# Patient Record
Sex: FEMALE | Race: WHITE | NOT HISPANIC OR LATINO | Employment: FULL TIME | URBAN - NONMETROPOLITAN AREA
[De-identification: names, ages, dates, MRNs, and addresses within clinical notes are randomized per-mention and may not be internally consistent; named-entity substitution may affect disease eponyms.]

---

## 2018-01-18 ENCOUNTER — OFFICE VISIT CONVERTED (OUTPATIENT)
Dept: FAMILY MEDICINE CLINIC | Age: 33
End: 2018-01-18
Attending: NURSE PRACTITIONER

## 2019-02-09 ENCOUNTER — HOSPITAL ENCOUNTER (EMERGENCY)
Facility: HOSPITAL | Age: 34
Discharge: PSYCHIATRIC OR SUBSTANCE USE FACILITY | End: 2019-02-09
Attending: EMERGENCY MEDICINE | Admitting: EMERGENCY MEDICINE

## 2019-02-09 ENCOUNTER — HOSPITAL ENCOUNTER (INPATIENT)
Facility: HOSPITAL | Age: 34
LOS: 6 days | Discharge: HOME | End: 2019-02-15
Attending: SPECIALIST | Admitting: SPECIALIST

## 2019-02-09 VITALS
TEMPERATURE: 98.8 F | HEART RATE: 94 BPM | RESPIRATION RATE: 16 BRPM | DIASTOLIC BLOOD PRESSURE: 86 MMHG | SYSTOLIC BLOOD PRESSURE: 128 MMHG | HEIGHT: 67 IN | OXYGEN SATURATION: 97 %

## 2019-02-09 DIAGNOSIS — F32.A DEPRESSION, UNSPECIFIED DEPRESSION TYPE: Primary | ICD-10-CM

## 2019-02-09 DIAGNOSIS — IMO0002 SELF-INFLICTED INJURY: ICD-10-CM

## 2019-02-09 DIAGNOSIS — R45.851 SUICIDAL IDEATION: ICD-10-CM

## 2019-02-09 PROBLEM — F33.2 MAJOR DEPRESSIVE DISORDER, RECURRENT SEVERE WITHOUT PSYCHOTIC FEATURES (HCC): Status: ACTIVE | Noted: 2019-02-09

## 2019-02-09 LAB
ALBUMIN SERPL-MCNC: 4.6 G/DL (ref 3.5–5.2)
ALBUMIN/GLOB SERPL: 1.5 G/DL
ALP SERPL-CCNC: 63 U/L (ref 39–117)
ALT SERPL W P-5'-P-CCNC: 47 U/L (ref 1–33)
AMPHET+METHAMPHET UR QL: POSITIVE
ANION GAP SERPL CALCULATED.3IONS-SCNC: 16.7 MMOL/L
APAP SERPL-MCNC: <5 MCG/ML (ref 10–30)
AST SERPL-CCNC: 16 U/L (ref 1–32)
BARBITURATES UR QL SCN: NEGATIVE
BASOPHILS # BLD AUTO: 0.02 10*3/MM3 (ref 0–0.2)
BASOPHILS NFR BLD AUTO: 0.3 % (ref 0–1.5)
BENZODIAZ UR QL SCN: POSITIVE
BILIRUB SERPL-MCNC: 0.4 MG/DL (ref 0.1–1.2)
BUN BLD-MCNC: 14 MG/DL (ref 6–20)
BUN/CREAT SERPL: 17.5 (ref 7–25)
CALCIUM SPEC-SCNC: 9.5 MG/DL (ref 8.6–10.5)
CANNABINOIDS SERPL QL: NEGATIVE
CHLORIDE SERPL-SCNC: 105 MMOL/L (ref 98–107)
CO2 SERPL-SCNC: 23.3 MMOL/L (ref 22–29)
COCAINE UR QL: NEGATIVE
CREAT BLD-MCNC: 0.8 MG/DL (ref 0.57–1)
DEPRECATED RDW RBC AUTO: 43.1 FL (ref 37–54)
EOSINOPHIL # BLD AUTO: 0.01 10*3/MM3 (ref 0–0.7)
EOSINOPHIL NFR BLD AUTO: 0.2 % (ref 0.3–6.2)
ERYTHROCYTE [DISTWIDTH] IN BLOOD BY AUTOMATED COUNT: 12.6 % (ref 11.7–13)
ETHANOL BLD-MCNC: <10 MG/DL (ref 0–10)
ETHANOL UR QL: <0.01 %
GFR SERPL CREATININE-BSD FRML MDRD: 82 ML/MIN/1.73
GLOBULIN UR ELPH-MCNC: 3 GM/DL
GLUCOSE BLD-MCNC: 94 MG/DL (ref 65–99)
HCT VFR BLD AUTO: 40.9 % (ref 35.6–45.5)
HGB BLD-MCNC: 14.1 G/DL (ref 11.9–15.5)
IMM GRANULOCYTES # BLD AUTO: 0 10*3/MM3 (ref 0–0.03)
IMM GRANULOCYTES NFR BLD AUTO: 0 % (ref 0–0.5)
LYMPHOCYTES # BLD AUTO: 1.54 10*3/MM3 (ref 0.9–4.8)
LYMPHOCYTES NFR BLD AUTO: 23.4 % (ref 19.6–45.3)
MCH RBC QN AUTO: 32.3 PG (ref 26.9–32)
MCHC RBC AUTO-ENTMCNC: 34.5 G/DL (ref 32.4–36.3)
MCV RBC AUTO: 93.6 FL (ref 80.5–98.2)
METHADONE UR QL SCN: NEGATIVE
MONOCYTES # BLD AUTO: 0.2 10*3/MM3 (ref 0.2–1.2)
MONOCYTES NFR BLD AUTO: 3 % (ref 5–12)
NEUTROPHILS # BLD AUTO: 4.8 10*3/MM3 (ref 1.9–8.1)
NEUTROPHILS NFR BLD AUTO: 73.1 % (ref 42.7–76)
OPIATES UR QL: NEGATIVE
OXYCODONE UR QL SCN: NEGATIVE
PLATELET # BLD AUTO: 208 10*3/MM3 (ref 140–500)
PMV BLD AUTO: 10.3 FL (ref 6–12)
POTASSIUM BLD-SCNC: 3.4 MMOL/L (ref 3.5–5.2)
PROT SERPL-MCNC: 7.6 G/DL (ref 6–8.5)
RBC # BLD AUTO: 4.37 10*6/MM3 (ref 3.9–5.2)
SALICYLATES SERPL-MCNC: <0.3 MG/DL
SODIUM BLD-SCNC: 145 MMOL/L (ref 136–145)
T4 FREE SERPL-MCNC: 1.53 NG/DL (ref 0.93–1.7)
TSH SERPL DL<=0.05 MIU/L-ACNC: 1.65 MIU/ML (ref 0.27–4.2)
WBC NRBC COR # BLD: 6.57 10*3/MM3 (ref 4.5–10.7)

## 2019-02-09 PROCEDURE — 80307 DRUG TEST PRSMV CHEM ANLYZR: CPT | Performed by: EMERGENCY MEDICINE

## 2019-02-09 PROCEDURE — 84439 ASSAY OF FREE THYROXINE: CPT | Performed by: PSYCHIATRY & NEUROLOGY

## 2019-02-09 PROCEDURE — 85025 COMPLETE CBC W/AUTO DIFF WBC: CPT | Performed by: PSYCHIATRY & NEUROLOGY

## 2019-02-09 PROCEDURE — 80053 COMPREHEN METABOLIC PANEL: CPT | Performed by: PSYCHIATRY & NEUROLOGY

## 2019-02-09 PROCEDURE — 84443 ASSAY THYROID STIM HORMONE: CPT | Performed by: PSYCHIATRY & NEUROLOGY

## 2019-02-09 PROCEDURE — 99284 EMERGENCY DEPT VISIT MOD MDM: CPT

## 2019-02-09 RX ORDER — ALUMINA, MAGNESIA, AND SIMETHICONE 2400; 2400; 240 MG/30ML; MG/30ML; MG/30ML
15 SUSPENSION ORAL EVERY 6 HOURS PRN
Status: DISCONTINUED | OUTPATIENT
Start: 2019-02-09 | End: 2019-02-15 | Stop reason: HOSPADM

## 2019-02-09 RX ORDER — DIAZEPAM 10 MG/1
10 TABLET ORAL EVERY 8 HOURS PRN
COMMUNITY

## 2019-02-09 RX ORDER — DIAZEPAM 5 MG/1
10 TABLET ORAL EVERY 8 HOURS PRN
Status: DISCONTINUED | OUTPATIENT
Start: 2019-02-09 | End: 2019-02-15 | Stop reason: HOSPADM

## 2019-02-09 RX ORDER — ACETAMINOPHEN 325 MG/1
650 TABLET ORAL EVERY 4 HOURS PRN
Status: DISCONTINUED | OUTPATIENT
Start: 2019-02-09 | End: 2019-02-15 | Stop reason: HOSPADM

## 2019-02-09 RX ORDER — LAMOTRIGINE 100 MG/1
200 TABLET ORAL DAILY
COMMUNITY
End: 2019-02-15 | Stop reason: HOSPADM

## 2019-02-09 RX ADMIN — ACETAMINOPHEN 650 MG: 325 TABLET, FILM COATED ORAL at 23:42

## 2019-02-09 RX ADMIN — DIAZEPAM 10 MG: 5 TABLET ORAL at 20:13

## 2019-02-09 NOTE — CONSULTS
"Pt is a 33 year old, , female seen today in ED 09. Pt has been  to Neeraj for 5 years, but dating him for 12. She has an older child (15) from a previous relationship, as well as two children (10 & 3) with current . 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 \"Relapse of my depression, anxiety, PTSD, paranoia.\" Pt lists off these diagnoses when asked what prompted her visit to the ED. Pt very guarded and resistant to elaborate. Finally states, \"I'd rather not share.\" Pt avoids eye contact throughout entire interview. Pt requests that work supervisor/friend, Virginia stay in the room with her during interview. Pt anxiously biting/chewing on lip.    \"It's just a wave of everything I've ever been diagnosed with. Usually it comes and goes but this time it has shut me down.\" Pt has been seeing Dr. Varela on an outpatient basis at [facility] for approximately three years. PT last saw him last week when a few medication changes were made. Pt reports she also sees a therapist through [facility] but has not seen them since the end of 2018. Pt has had one previous inpatient psychiatric admission on [facility] in \"2012 or 2013 I think\" followed by IOP at . Pt states she had a previous suicide attempt, and when asked to elaborate she just states, \"Pills. A couple years ago.\"     Pt still unable to elaborate on recent events leading up to ED visit, so Virginia elaborates. Pt was at work for the past week, very flat, withdrawn, \"disconnected\", \"zoning off\", was not interacting with her peers like normal, and had become paranoid that supervisor was writing pt up, or going to fire her. Over the course of the past week, supervisor has been very encouraging towards patient, urging her to reach back out to Dr. Varela with how she was feeling in case it was an effect of the recent medication change. Apparently yesterday, supervisor made pt promise to call him, or she would call for her. Pt reports that she went " "home last night, cut her wrists, and then ended up calling Dr. Varela to inform him what was going on, who then recommended she come to the ED.     Pt has superficial cuts on bilateral forearms. Pt reports that she did this last night, but has done it before. Pt denies ever cutting anywhere else other than her wrists. Pt voices that she has been thinking of multiple ways of killing herself. These plans include, jumping off a bridge, taking \"anything I can find\" to fall asleep in the tub and drown, or \"slice my wrists harder to make sure I get the right vein.\"     Pt states that her  is not very supportive, and \"thinks I bring this on myself.\" Per Dr. Varela, when pt called him last night,  had warned her that if she went to the hospital, he would take the kids away from her. This caused pt to lie about where she was going this morning when leaving the house. Pt's parents came to the ED, picked up pt's car and apparently drove to pt's home to \"have an intervention\" with the . Pt does not mention her children at all during interview.     Discussed case with Dr. Varela who wants to admit pt to [facility], with high suicide precautions and a sitter in place. Medications were confirmed with pharmacy and reviewed with Dr. Varela as well. Pt informed of decision to admit, and was less than happy. Informed pt that she would be going up to [facility] and she would have a chance to sign herself in voluntarily for treatment. PT was reluctant to this idea. Reminded pt that she had been placed on a 72 hour hold when brought into the ED and that this hold could be continued to ensure pt's safety. Pt wants time to discuss this with Virginia, who seems very encouraging for pt to get help.     Report called to [facility] on [facility]  "

## 2019-02-09 NOTE — PLAN OF CARE
Problem: Patient Care Overview  Goal: Plan of Care Review  Outcome: Ongoing (interventions implemented as appropriate)   02/09/19 1835   OTHER   Outcome Summary PT to arrived on floor 1640 hrs, alert and orientated x 4. HSP in affect with 1: sitter at bedside. PT rates: anxiety 8/10, depression 9/10. Verbalized SI thoughts with no plan but with an opportunity. Otherwise,  Cooperative and  follows directions. Denies hallucinations nor pain. PT was orientated to floor. PT asked to phone all parents to bring in home meds that are nonformulary. PT phoned her father. Will continue to monitor behavior and provide support.    Coping/Psychosocial   Plan of Care Reviewed With patient   Coping/Psychosocial   Patient Agreement with Plan of Care agrees   Plan of Care Review   Progress no change     Goal: Individualization and Mutuality  Outcome: Ongoing (interventions implemented as appropriate)   02/09/19 1835   Personal Strengths/Vulnerabilities   Patient Personal Strengths expressive of emotions;expressive of needs;family/social support;medication/treatment adherence     Goal: Discharge Needs Assessment  Outcome: Ongoing (interventions implemented as appropriate)    Goal: Interprofessional Rounds/Family Conf  Outcome: Ongoing (interventions implemented as appropriate)      Problem: Overarching Goals (Adult)  Goal: Adheres to Safety Considerations for Self and Others  Outcome: Ongoing (interventions implemented as appropriate)    Goal: Optimized Coping Skills in Response to Life Stressors  Outcome: Ongoing (interventions implemented as appropriate)    Goal: Develops/Participates in Therapeutic [place] to Support Successful Transition  Outcome: Ongoing (interventions implemented as appropriate)      Problem: Suicidal Behavior (Adult)  Intervention: Provide Immediate/Ongoing Protective Physical Environment   02/09/19 1835   Provide Immediate/Ongoing Protective Physical Environment   Mutually Determined Action Steps (Provide  Immediate/Ongoing Protective Physical Environment) shares suicidal thoughts       Goal: Suicidal Behavior is Absent/Minimized/Managed  Outcome: Ongoing (interventions implemented as appropriate)

## 2019-02-09 NOTE — PLAN OF CARE
Problem: Suicidal Behavior (Adult)  Goal: Suicidal Behavior is Absent/Minimized/Managed  Outcome: Ongoing (interventions implemented as appropriate)   02/09/19 1525   OTHER   Action Step/Short Term Goal (STG) Established 02/09/19   Suicidal Behavior is Absent/Minimized/Managed   Suicidal Behavior Managed/Minimized Time Frame for Action Step (STG) 4 days   Suicidal Behavior Managed/Minimized Action Step (STG) Outcome making progress toward outcome       Problem: Mood Impairment (Depressive Signs/Symptoms) (Adult)  Goal: Improved Mood Symptoms (Depressive Signs/Symptoms)  Outcome: Ongoing (interventions implemented as appropriate)   02/09/19 1525   Improved Mood Symptoms (Depressive Signs/Symptoms)   Improved Mood Symptoms Action Step/Short Term Goal (STG) Established 02/09/19   Improved Mood Symptoms Time Frame for Action Step (STG) 4 days   Improved Mood Symptoms Action Step (STG) Outcome making progress toward outcome       Problem: Feelings of Worthlessness, Hopelessness, Excessive Guilt (Depressive Signs/Symptoms) (Adult)  Goal: Enhanced Self-Esteem/Confidence (Depressive Signs/Symptoms)  Outcome: Ongoing (interventions implemented as appropriate)   02/09/19 1525   Enhanced Self-Esteem/Confidence (Depressive Signs/Symptoms)   Enhanced Self-Esteem/Confidence Action Step/Short Term Goal (STG) Established 02/09/19   Enhanced Self-Esteem/Confidence Time Frame for Action Step (STG) 4 days   Enhanced Self-Esteem/Confidence Action Step (STG) Outcome making progress toward outcome       Problem: Decreased Participation/Engagement (Depressive Signs/Symptoms) (Adult)  Goal: Increased Participation/Engagement (Depressive Signs/Symptoms)  Outcome: Ongoing (interventions implemented as appropriate)   02/09/19 1525   Increased Participation/Engagement (Depressive Signs/Symptoms)   Increased Participation/Engagement Action Step/Short Term Goal (STG) Established 02/09/19   Increased Participation/Engagement Time Frame for Action  Step (STG) 4 days   Increased Participation/Engagement Action Step (STG) Outcome making progress toward outcome       Problem: Sleep Impairment (Depressive Signs/Symptoms) (Adult)  Goal: Improved Sleep Hygiene (Depressive Signs/Symptoms)  Outcome: Ongoing (interventions implemented as appropriate)   02/09/19 1525   Improved Sleep Hygiene (Depressive Signs/Symptoms)   Improved Sleep Hygiene Action Step/Short Term Goal (STG) Established 02/09/19   Improved Sleep Hygiene Time Frame for Action Step (STG) 4 days   Improved Sleep Hygiene Action Step (STG) Outcome making progress toward outcome       Problem: Social/Occupational/Functional Impairment (Depressive Signs/Symptoms) (Adult)  Goal: Improved Social/Occupational/Functional Skills (Depressive Signs/Symptoms)  Outcome: Ongoing (interventions implemented as appropriate)   02/09/19 1525   Improved Social/Occupational/Functional Skills (Depressive Signs/Symptoms)   Improved Social/Occupational/Functional Skills Action Step/Short Term Goal (STG) Established 02/09/19   Improved Social/Occupational/Functional Skills Time Frame for Action Step (STG) 4 days   Improved Social/Occupational/Functional Skills Action Step (STG) Outcome making progress toward outcome       Problem: Energy/Vigor Impairment (Depressive Signs/Symptoms) (Adult)  Goal: Improved Energy/Vigor (Depressive Signs/Symptoms)  Outcome: Ongoing (interventions implemented as appropriate)   02/09/19 1525   Improved Energy/Vigor (Depressive Signs/Symptoms)   Improved Energy/Vigor Action Step/Short Term Goal (STG) Established 02/09/19   Improved Energy/Vigor Time Frame for Action Step (STG) 4 days   Improved Energy/Vigor Action Step (STG) Outcome making progress toward outcome

## 2019-02-09 NOTE — ED PROVIDER NOTES
" EMERGENCY DEPARTMENT ENCOUNTER    CHIEF COMPLAINT  Chief Complaint: Depression  History given by: Patient  History limited by: None  Room Number: 09/09  PMD: Provider, No Known      HPI:  Pt is a 33 y.o. female who presents with SI related to her depression that has gradually worsened over the last \"few weeks\". She also states she has had increased stress due to the environment she works in. She admits to having varying plans of how she would end her life including jumping off the walking bridge, overdosing on pills, and cutting her wrists. She has harmed herself in the past by cutting her forearms with a razor blade. Pt is on Valium, Vyvanse, Lamictal, and Vraylar. Her Lamictal was increased and her Vraylar was decreased approx. one month ago. Hx of depression and bipolar disorder.    Duration: Worsened over the last \"few weeks\"  Onset: Gradual  Timing: Constant  Intensity/Severity: Moderate  Progression: Gradually worsened  Associated Symptoms: SI  Previous Episodes: Hx of bipolar disorder and depression.  Treatment before arrival: Pt is on Valium, Vyvanse, Lamictal, and Vraylar. Her Lamictal was increased and her Vraylar was decreased approx. one month ago.    PAST MEDICAL HISTORY  Active Ambulatory Problems     Diagnosis Date Noted   • No Active Ambulatory Problems     Resolved Ambulatory Problems     Diagnosis Date Noted   • No Resolved Ambulatory Problems     Past Medical History:   Diagnosis Date   • Bipolar disorder ([facility])    • Depression        PAST SURGICAL HISTORY  History reviewed. No pertinent surgical history.    FAMILY HISTORY  History reviewed. No pertinent family history.    SOCIAL HISTORY  Social History     Socioeconomic History   • Marital status:      Spouse name: Not on file   • Number of children: Not on file   • Years of education: Not on file   • Highest education level: Not on file   Social Needs   • Financial resource strain: Not on file   • Food insecurity - worry: Not on file " "  • Food insecurity - inability: Not on file   • Transportation needs - medical: Not on file   • Transportation needs - non-medical: Not on file   Occupational History   • Not on file   Tobacco Use   • Smoking status: Not on file   Substance and Sexual Activity   • Alcohol use: Not on file   • Drug use: Not on file   • Sexual activity: Not on file   Other Topics Concern   • Not on file   Social History Narrative   • Not on file       ALLERGIES  Patient has no known allergies.    REVIEW OF SYSTEMS  Review of Systems   Constitutional: Negative for fever.   HENT: Negative for sore throat.    Eyes: Negative.    Respiratory: Negative for cough and shortness of breath.    Cardiovascular: Negative for chest pain.   Gastrointestinal: Negative for abdominal pain, diarrhea and vomiting.   Genitourinary: Negative for dysuria.   Musculoskeletal: Negative for neck pain.   Skin: Negative for rash.   Neurological: Negative for weakness, numbness and headaches.   Hematological: Negative.    Psychiatric/Behavioral: Positive for suicidal ideas.        Depression that has worsened over the last \"few weeks\"   All other systems reviewed and are negative.      PHYSICAL EXAM  ED Triage Vitals   Temp Heart Rate Resp BP SpO2   02/09/19 1226 02/09/19 1226 02/09/19 1226 02/09/19 1231 02/09/19 1226   98.8 °F (37.1 °C) 111 16 (!) 147/102 98 %      Temp src Heart Rate Source Patient Position BP Location FiO2 (%)   02/09/19 1226 02/09/19 1226 -- -- --   Tympanic Monitor          Physical Exam   Constitutional: She is oriented to person, place, and time. No distress.   Pt is cooperative.   HENT:   Head: Normocephalic and atraumatic.   Eyes: EOM are normal. Pupils are equal, round, and reactive to light.   Neck: Normal range of motion. Neck supple.   Cardiovascular: Normal rate, regular rhythm and normal heart sounds.   Pulmonary/Chest: Effort normal and breath sounds normal. No respiratory distress.   Abdominal: Soft. There is no tenderness. There " is no rebound and no guarding.   Musculoskeletal: Normal range of motion. She exhibits no edema.   Neurological: She is alert and oriented to person, place, and time. She has normal sensation and normal strength.   Skin: Skin is warm and dry. No rash noted.   Several superficial incised wounds to the volar surface of her bilateral wrists.   Psychiatric: Her affect is blunt. She exhibits a depressed mood. She expresses suicidal ideation. She expresses suicidal plans. She has a flat affect.   Nursing note and vitals reviewed.      LAB RESULTS  Lab Results (last 24 hours)     Procedure Component Value Units Date/Time    Urine Drug Screen - Urine, Clean Catch [[id number]]  (Abnormal) Collected:  02/09/19 1239    Specimen:  Urine, Clean Catch Updated:  02/09/19 1328     Amphet/Methamphet, Screen Positive     Barbiturates Screen, Urine Negative     Benzodiazepine Screen, Urine Positive     Cocaine Screen, Urine Negative     Opiate Screen Negative     THC, Screen, Urine Negative     Methadone Screen, Urine Negative     Oxycodone Screen, Urine Negative    Narrative:       Negative Thresholds For Drugs Screened:     Amphetamines               500 ng/ml   Barbiturates               200 ng/ml   Benzodiazepines            100 ng/ml   Cocaine                    300 ng/ml   Methadone                  300 ng/ml   Opiates                    300 ng/ml   Oxycodone                  100 ng/ml   THC                        50 ng/ml    The Normal Value for all drugs tested is negative. This report includes final unconfirmed screening results to be used for medical treatment purposes only. Unconfirmed results must not be used for non-medical purposes such as employment or legal testing. Clinical consideration should be applied to any drug of abuse test, particulary when unconfirmed results are used.    Acetaminophen Level [[id number]]  (Abnormal) Collected:  02/09/19 1248    Specimen:  Blood Updated:  02/09/19 1350     Acetaminophen <5.0  mcg/mL     Salicylate Level [[id number]] Collected:  02/09/19 1248    Specimen:  Blood Updated:  02/09/19 1350     Salicylate <0.3 mg/dL     Narrative:       Therapeutic range for Salicylates:  3.0 - 10.0 mg/dL for antipyretic/analgesic conditions  15.0 - 30.0 mg/dL for anti-inflammatory conditions    Ethanol [[id number]] Collected:  02/09/19 1248    Specimen:  Blood Updated:  02/09/19 1350     Ethanol <10 mg/dL      Ethanol % <0.010 %           I ordered the above labs and reviewed the results    PROGRESS AND CONSULTS  ED Course as of Feb 09 1446   Sat Feb 09, 2019   1347 1:47 PM  Medically cleared for psychiatric evaluation.  [WC]      ED Course User Index  [WC] Ant Gaspar MD     1229 Ordered UDS, acetaminophen level, ethanol, and salicylate level for further evaluation. Placed call to [facility] for psychiatric evaluation. She is not placed on a legal 72 hour hold.    1427 Pt has been evaluated by [facility] and meets impatient criteria. Plan to admit. Pt understands and agrees with the plan, all questions answered.    MEDICAL DECISION MAKING  Results were reviewed/discussed with the patient and they were also made aware of online access. Pt also made aware that some labs, such as cultures, will not be resulted during ER visit and follow up with PMD is necessary.     MDM  Number of Diagnoses or Management Options     Amount and/or Complexity of Data Reviewed  Clinical lab tests: ordered and reviewed (UDS is positive for methamphetamines and benzodiazepines. Ethanol= <10.)  Decide to obtain previous medical records or to obtain history from someone other than the patient: yes    Patient Progress  Patient progress: stable         DIAGNOSIS  Final diagnoses:   Depression, unspecified depression type   Suicidal ideation   Self-inflicted injury       DISPOSITION  ADMISSION TO BEHAVIORAL HEALTH    Discussed treatment plan and reason for admission with pt/family and admitting physician.  Pt/family voiced understanding of  the plan for admission for further testing/treatment as needed.     Latest Documented Vital Signs:  As of 2:46 PM  BP- (!) 147/102 HR- 111 Temp- 98.8 °F (37.1 °C) (Tympanic) O2 sat- 98%    --  Documentation assistance provided by lucas Burch for Dr. Gaspar.  Information recorded by the scribe was done at my direction and has been verified and validated by me.     Isha Burch  02/09/19 8634       Ant Gaspar MD  02/09/19 7190

## 2019-02-10 PROBLEM — J45.909 MODERATE ASTHMA: Status: ACTIVE | Noted: 2019-02-10

## 2019-02-10 LAB — HBA1C MFR BLD: 5.07 % (ref 4.8–5.6)

## 2019-02-10 PROCEDURE — 94799 UNLISTED PULMONARY SVC/PX: CPT

## 2019-02-10 PROCEDURE — 94640 AIRWAY INHALATION TREATMENT: CPT

## 2019-02-10 PROCEDURE — 83036 HEMOGLOBIN GLYCOSYLATED A1C: CPT | Performed by: PSYCHIATRY & NEUROLOGY

## 2019-02-10 RX ORDER — BUDESONIDE AND FORMOTEROL FUMARATE DIHYDRATE 160; 4.5 UG/1; UG/1
2 AEROSOL RESPIRATORY (INHALATION)
Status: DISCONTINUED | OUTPATIENT
Start: 2019-02-10 | End: 2019-02-15 | Stop reason: HOSPADM

## 2019-02-10 RX ORDER — LAMOTRIGINE 100 MG/1
200 TABLET ORAL DAILY
Status: DISCONTINUED | OUTPATIENT
Start: 2019-02-11 | End: 2019-02-14

## 2019-02-10 RX ORDER — ALBUTEROL SULFATE 2.5 MG/3ML
2.5 SOLUTION RESPIRATORY (INHALATION) EVERY 6 HOURS PRN
Status: DISCONTINUED | OUTPATIENT
Start: 2019-02-10 | End: 2019-02-15 | Stop reason: HOSPADM

## 2019-02-10 RX ADMIN — DIAZEPAM 10 MG: 5 TABLET ORAL at 06:57

## 2019-02-10 RX ADMIN — ALBUTEROL SULFATE 2.5 MG: 2.5 SOLUTION RESPIRATORY (INHALATION) at 12:40

## 2019-02-10 RX ADMIN — ACETAMINOPHEN 650 MG: 325 TABLET, FILM COATED ORAL at 09:33

## 2019-02-10 RX ADMIN — DIAZEPAM 10 MG: 5 TABLET ORAL at 22:37

## 2019-02-10 RX ADMIN — BUDESONIDE AND FORMOTEROL FUMARATE DIHYDRATE 2 PUFF: 160; 4.5 AEROSOL RESPIRATORY (INHALATION) at 21:10

## 2019-02-10 RX ADMIN — LAMOTRIGINE 150 MG: 100 TABLET ORAL at 09:25

## 2019-02-10 RX ADMIN — ALBUTEROL SULFATE 2.5 MG: 2.5 SOLUTION RESPIRATORY (INHALATION) at 12:39

## 2019-02-10 RX ADMIN — BUDESONIDE AND FORMOTEROL FUMARATE DIHYDRATE 2 PUFF: 160; 4.5 AEROSOL RESPIRATORY (INHALATION) at 12:34

## 2019-02-10 NOTE — NURSING NOTE
"Notified at 0653 by pt's sitter that pt was using her fingernails and cutting her wrists. When nurse entered room pt was standing by window visibly anxious and shaking, whispering. Helped pt to her bed to sit and instructed pt that I have her PRN medication. Pt was hesitant at first, and stating \"no\" to taking medication. This nurse instructed pt that this medication was prescribed by her doctor for anxiety and she was exhibiting anxious behavior and that for her benefit she needed to take it. After much prompting pt complied and took PRN. New bilateral superficial nail scratches noted. Pt will not contract for safety, and continues to state \"if given the opportunity she will harm self.\" At this time sitter remains next to pt.  "

## 2019-02-10 NOTE — CONSULTS
"    [facility]  ASSOCIATES  [phone]    CONSULT NOTE    INTERNAL MEDICINE   [facility]     Referring Provider: Dr. Sheffield  Reason for Consultation: Medical evaluation of asthma      Chief complaint : \"I'm tired\"    Subjective .     History of present illness: This is a 33-year-old female with a history of asthma and bipolar disorder who presents to the hospital with emergent evaluation of depression and suicidal ideation.  She was seen in the ER by the [facility] and recommendations were made for inpatient psychiatric management.  She was admitted to the crisis management unit for stabilization last night.  We've been consulted for evaluation of her underlying asthma.  At home she takes albuterol and Symbicort.  These were not on her home medication reconciliation.  She's followed by her primary care physician.  She says she's been increasingly short of breath and had a lot of coughing over the winter.  She says this is normally the case.  She's been using her rescue inhaler more often over the last few months.  She can't quantify how often she uses it or how long it takes for her to go through a rescue inhaler.  She doesn't know the dose of the Symbicort she takes but says that she's compliant with this at home.  She denies any fevers or chills no productive cough but does have some chest pain with coughing.    Review of Systems  The following systems were reviewed and negative;  constitution, eyes, ENT, cardiovascular, gastrointestinal, genitourinary, integument, hematologic / lymphatic, musculoskeletal, neurological, behavioral/psych, endocrine and allergies / immunologic    Past Medical History:   Diagnosis Date   • Bipolar disorder (CMS/[facility])    • Depression      History reviewed. No pertinent surgical history.  History reviewed. No pertinent family history.  Social History     Tobacco Use   • Smoking status: Not on file   Substance Use Topics   • Alcohol use: Not on " "file   • Drug use: Not on file     Medications Prior to Admission   Medication Sig Dispense Refill Last Dose   • Cariprazine HCl (VRAYLAR) 1.5 MG capsule capsule Take 1.5 mg by mouth Daily.      • diazePAM (VALIUM) 10 MG tablet Take 10 mg by mouth Every 8 (Eight) Hours As Needed for Anxiety.      • lamoTRIgine (LaMICtal) 100 MG tablet Take 200 mg by mouth Daily.      • lisdexamfetamine (VYVANSE) 60 MG capsule Take 60 mg by mouth Every Morning      • Vortioxetine HBr (TRINTELLIX) 10 MG tablet Take  by mouth.          budesonide-formoterol 2 puff Inhalation BID - RT   Cariprazine HCl 1.5 mg Oral Daily   lamoTRIgine 150 mg Oral Daily   lisdexamfetamine 60 mg Oral Daily   Vortioxetine HBr 10 mg Oral Daily     Allergies:   Patient has no known allergies.    Objective     Vital Signs   Temp:  [98.5 °F (36.9 °C)-98.8 °F (37.1 °C)] 98.5 °F (36.9 °C)  Heart Rate:  [] 99  Resp:  [16] 16  BP: (128-147)/() 134/99  No intake or output data in the 24 hours ending 02/10/19 0954  Flowsheet Rows      First Filed Value   Admission Height  172.7 cm (68\") Documented at 02/09/2019 1606   Admission Weight  70 kg (154 lb 6 oz) Documented at 02/09/2019 1606          Physical Exam:     General Appearance:    Alert, cooperative, in no acute distress   Head:    Normocephalic, without obvious abnormality, atraumatic   Eyes:            Lids and lashes normal, conjunctivae and sclerae normal, no   icterus, no pallor, corneas clear, PERRLA   Ears:    Ears appear intact with no abnormalities noted   Throat:   No oral lesions, no thrush, oral mucosa moist   Neck:   No adenopathy, supple, trachea midline, no thyromegaly, no   carotid bruit, no JVD   Back:     No kyphosis present, no scoliosis present, no skin lesions,      erythema or scars, no tenderness to percussion or                   palpation,   range of motion normal   Lungs:     Bilateral expiratory wheeze decent air entry     Heart:    Regular rhythm and normal rate, normal S1 " and S2, no            murmur, no gallop, no rub, no click   Chest Wall:    No abnormalities observed   Abdomen:     Normal bowel sounds, no masses, no organomegaly, soft        non-tender, non-distended, no guarding, no rebound                tenderness   Rectal:     Deferred   Extremities:   Moves all extremities well, no edema, no cyanosis, no             redness   Pulses:   Pulses palpable and equal bilaterally   Skin:   No bleeding, bruising or rash   Lymph nodes:   No palpable adenopathy   Neurologic:   Cranial nerves 2 - 12 grossly intact, sensation intact, DTR       present and equal bilaterally       Results Review:   I reviewed the patient's new clinical results.  I reviewed the patient's new imaging results and agree with the interpretation.  I reviewed the patient's other test results and agree with the interpretation    Results from last 7 days   Lab Units 02/09/19  1648   WBC 10*3/mm3 6.57   HEMOGLOBIN g/dL 14.1   HEMATOCRIT % 40.9   PLATELETS 10*3/mm3 208     Results from last 7 days   Lab Units 02/09/19  1248   SODIUM mmol/L 145   POTASSIUM mmol/L 3.4*   CHLORIDE mmol/L 105   CO2 mmol/L 23.3   BUN mg/dL 14   CREATININE mg/dL 0.80   GLUCOSE mg/dL 94   CALCIUM mg/dL 9.5     Results from last 7 days   Lab Units 02/09/19  1248   ALK PHOS U/L 63   BILIRUBIN mg/dL 0.4   ALT (SGPT) U/L 47*   AST (SGOT) U/L 16                 Assessment/Plan       Major depressive disorder, recurrent severe without psychotic features (CMS/HCC)    Moderate asthma    She doesn't have a true asthma exacerbation at the present time but does have some decompensated symptoms.  I've restarted her Symbicort an increased dose and have ordered albuterol breathing treatments for here in the hospital.  I discussed with the nurse about getting her one now.  I recommend continuing these medications and she can follow-up with her primary care physician in the outpatient setting.  Otherwise at this time she seems medically stable.  I see no  reason for current or active medical involvement.  If any further issues arise please hesitate to recall but will sign off for now.  I discussed the patients findings and my recommendations with patient and nursing staff    Thank you very much for allowing us to participate in your patient's care.    Wilfredo Munoz MD  02/10/19  9:54 AM    Time: 60 minutes

## 2019-02-10 NOTE — H&P
"Informants:  Patient, chart reliable    Date of admission:  February 9, 2019  Date of assessment: February 10, 2019    Chief Complaint: \"Im suicidal.\"    History of presenting illness: Patient is a 33 y.o. female presenting with the above chief complaint.  Patient is well known to this physician as she is seen on outpatient basis at [facility] by myself and Charlie Steinberg.  She has a history of major depressive disorder, posttraumatic stress disorder, ADHD and borderline personality disorder.  Patient was last seen 1-2 weeks ago.  At that time, her Lamictal was increased from 150 to 200 mg and Vraylar was decreased from 3 mg to 1.5mg.  The patient phoned [facility] after hours February 8, 2019 and spoke with me (twice) as I was the on call physician.  She reported at that time that she had had increasing suicidal ideation.  She was then recommended to come to the emergency department at [facility] for assessment and likely admission.  She does have a previous admission in approximately [id number] to [facility] [place], operated by [facility] but records are not available for review.  A suicide attempt prompted that admission.  She has no other past admissions or suicide attempts.      Patient reports she's been feeling overwhelmed with life.  She has had increasing anxiety, depression and poor work performance.  Patient describes \"zoning out\" at work.  Her work performance worsened to the extent that her supervisor reach out her to make her seek care.  Patient reports that she has been stressed by her relationship with her  and her difficulties at work.   appears to be very nonsupportive and apparently threatened to take her children if she were to go to the hospital.  a reported past psychiatric history of depression.  Patient reports that she has had increasing suicidal ideation throughout the past week.  She indicates that she wrote a suicide note on February 6, 2019.  She indicates that she had various suicide plans including cutting her " wrists, drowning herself or jumping off her building.  This morning she attempted to kill herself by wrapping a sock around her throat.  She describes intent with this gesture.  She was admitted on a one-to-one and this remains in place.  There is no current or past psychosis.    Past psychiatric history:  See history of present illness    Past medical history:   Diagnoses:  None  Medications:  Lamictal 200 mg daily, Vyvanse 60 mg daily, Trintellix 10 mg daily, Vraylar 3 milligrams daily  Allergies:  NKDA    Social history: The patient lives with her  of 5 years and her 3 children age 3, 10 and 15 years old.  She has a college degree.  She is employed at [employer].  No legal history.    Family history: Noncontributory.      Substance abuse history:   None    Vital Signs    Temp:  98.8  Heart Rate:  98  Resp:  16  BP: 122/78    Mental Status Exam: The patient is found lying in bed. She is wearing a hospital gown.  She Place fair grooming and hygiene.  She is alert and oriented ×4.  She is generally appropriate and cooperative to the interview process.  She displays poor  eye contact and displays no psychomotor abnormalities.  Her speech is fluent, with a decreased tone and volume.  Mood is described as being depressed and anxious.  Affect is congruent, flat.  She continues to endorse positive suicidal ideation.  She denies current homicidal ideations.  She denies psychosis.  Thought processes are circumstantial.  Judgment and insight is poor.  Memory is intact.    Assets: Fair health  Liabilities: Poor support system    Assessment:   Axis I:  Major depressive disorder, severe, recurrent without psychotic features;  Posttraumatic stress disorder, chronic;  Attention deficit hyperactivity disorder, inattentive type;  Axis II: Borderline personality disorder  Axis III: None acute    Treatment Plan: The patient is admitted to the [facility] for safety and stabilization.  She will receive a medical evaluation.  She'll be  provided with standard laboratory examination and standard prn medications.  She was admitted on a one-to-one, and this will be continued given her gesture earlier today.  All medications will be resumed.  The patient was initially decreased on Lamictal to 150 mg due to concerns of this affecting her mentation, however it will be continued at 200 mg at this time.  I will increase Trintellix to 20 mg for depression.  Patient understands medication risk benefits and side effects.  The patient will receive individual and group therapy.  She would likely benefit from placement into an intensive outpatient program upon completion of acute inpatient care.  Dr. Sheffield will assume care tomorrow.    Estimated length of stay is 3-7 days.  Prognosis is fair.

## 2019-02-10 NOTE — PLAN OF CARE
"Problem: Patient Care Overview  Goal: Plan of Care Review  Outcome: Ongoing (interventions implemented as appropriate)   02/09/19 1835 02/09/19 2219 02/10/19 0328   OTHER   Outcome Summary --  --  Pt was standing at window during hand-off with previous nurse holding her ears. I told the pt I was her nurse, the pt would not make eye contact or put hands down. PRN Valium given at 2013 d/t pt \"pawing\" at the window stating \"I want to jump out the window.\" Pt educated that the window does not open and that she was safe in the hospital. Pt able to lay down and sleep some. Later sitter called nurse, stated pt was rocking back in forth in bed with head in hands crying. Pt was stating \"he's here, Liam's here.\" Reassured pt that she was safe and no one was in her room besides her and the sitter. Pt was able to calm down and lay back down. Pt was not able to contract for safety. Sitter remains at bedside for continued safety. Will continue to provide feedback and safety.   Coping/Psychosocial   Plan of Care Reviewed With --  patient --    Coping/Psychosocial   Patient Agreement with Plan of Care --  agrees --    Plan of Care Review   Progress no change --  --        Problem: Overarching Goals (Adult)  Goal: Adheres to Safety Considerations for Self and Others  Outcome: Ongoing (interventions implemented as appropriate)   02/10/19 0328   Overarching Goals (Adult)   Adheres to Safety Considerations for Self and Others making progress toward outcome     Goal: Optimized Coping Skills in Response to Life Stressors  Outcome: Ongoing (interventions implemented as appropriate)   02/10/19 0328   Overarching Goals (Adult)   Optimized Coping Skills in Response to Life Stressors making progress toward outcome     Goal: Develops/Participates in Therapeutic [place] to Support Successful Transition  Outcome: Ongoing (interventions implemented as appropriate)   02/10/19 0328   Overarching Goals (Adult)   Develops/Participates in Therapeutic " [place] to Support Successful Transition making progress toward outcome       Problem: Suicidal Behavior (Adult)  Goal: Suicidal Behavior is Absent/Minimized/Managed  Outcome: Ongoing (interventions implemented as appropriate)   02/10/19 0328   Suicidal Behavior is Absent/Minimized/Managed   Suicidal Behavior Managed/Minimized Action Step (STG) Outcome making progress toward outcome       Problem: Mood Impairment (Depressive Signs/Symptoms) (Adult)  Goal: Improved Mood Symptoms (Depressive Signs/Symptoms)  Outcome: Ongoing (interventions implemented as appropriate)   02/10/19 0328   Improved Mood Symptoms (Depressive Signs/Symptoms)   Improved Mood Symptoms Action Step (STG) Outcome making progress toward outcome       Problem: Feelings of Worthlessness, Hopelessness, Excessive Guilt (Depressive Signs/Symptoms) (Adult)  Goal: Enhanced Self-Esteem/Confidence (Depressive Signs/Symptoms)  Outcome: Ongoing (interventions implemented as appropriate)   02/10/19 0328   Enhanced Self-Esteem/Confidence (Depressive Signs/Symptoms)   Enhanced Self-Esteem/Confidence Action Step (STG) Outcome making progress toward outcome       Problem: Decreased Participation/Engagement (Depressive Signs/Symptoms) (Adult)  Goal: Increased Participation/Engagement (Depressive Signs/Symptoms)  Outcome: Ongoing (interventions implemented as appropriate)   02/10/19 0328   Increased Participation/Engagement (Depressive Signs/Symptoms)   Increased Participation/Engagement Action Step (STG) Outcome making progress toward outcome       Problem: Sleep Impairment (Depressive Signs/Symptoms) (Adult)  Goal: Improved Sleep Hygiene (Depressive Signs/Symptoms)  Outcome: Ongoing (interventions implemented as appropriate)   02/10/19 0328   Improved Sleep Hygiene (Depressive Signs/Symptoms)   Improved Sleep Hygiene Action Step (STG) Outcome making progress toward outcome       Problem: Social/Occupational/Functional Impairment (Depressive Signs/Symptoms)  (Adult)  Goal: Improved Social/Occupational/Functional Skills (Depressive Signs/Symptoms)  Outcome: Ongoing (interventions implemented as appropriate)   02/10/19 0328   Improved Social/Occupational/Functional Skills (Depressive Signs/Symptoms)   Improved Social/Occupational/Functional Skills Action Step (STG) Outcome making progress toward outcome       Problem: Energy/Vigor Impairment (Depressive Signs/Symptoms) (Adult)  Goal: Improved Energy/Vigor (Depressive Signs/Symptoms)  Outcome: Ongoing (interventions implemented as appropriate)   02/10/19 0328   Improved Energy/Vigor (Depressive Signs/Symptoms)   Improved Energy/Vigor Action Step (STG) Outcome making progress toward outcome

## 2019-02-11 PROCEDURE — 94799 UNLISTED PULMONARY SVC/PX: CPT

## 2019-02-11 RX ADMIN — BUDESONIDE AND FORMOTEROL FUMARATE DIHYDRATE 2 PUFF: 160; 4.5 AEROSOL RESPIRATORY (INHALATION) at 20:05

## 2019-02-11 RX ADMIN — DIAZEPAM 10 MG: 5 TABLET ORAL at 14:47

## 2019-02-11 RX ADMIN — LAMOTRIGINE 200 MG: 100 TABLET ORAL at 08:26

## 2019-02-11 RX ADMIN — BUDESONIDE AND FORMOTEROL FUMARATE DIHYDRATE 2 PUFF: 160; 4.5 AEROSOL RESPIRATORY (INHALATION) at 09:00

## 2019-02-11 NOTE — PLAN OF CARE
Problem: Patient Care Overview  Goal: Plan of Care Review  Outcome: Ongoing (interventions implemented as appropriate)   02/10/19 2311 02/11/19 0248   OTHER   Outcome Summary --  Pt remained in room this shift with sitter at bedside. No further attempts of self harming this shift. Pt rated both anxiety and depression 10/10, still voicing SI with no current plan. Pt remains with poor eye contact and minimal speech. Pt requested PRN valium for increased anxiety, given at 2237. Pt was shaking, fretful and at times tearful. Extra safety precautions remain in place d/t pts earlier self harm attempt. Will continue to provide feedback and support.   Coping/Psychosocial   Plan of Care Reviewed With patient --    Coping/Psychosocial   Patient Agreement with Plan of Care agrees --    Plan of Care Review   Progress --  no change       Problem: Overarching Goals (Adult)  Goal: Adheres to Safety Considerations for Self and Others  Outcome: Ongoing (interventions implemented as appropriate)   02/11/19 0248   Overarching Goals (Adult)   Adheres to Safety Considerations for Self and Others making progress toward outcome     Goal: Optimized Coping Skills in Response to Life Stressors  Outcome: Ongoing (interventions implemented as appropriate)   02/11/19 0248   Overarching Goals (Adult)   Optimized Coping Skills in Response to Life Stressors making progress toward outcome     Goal: Develops/Participates in Therapeutic [place] to Support Successful Transition  Outcome: Ongoing (interventions implemented as appropriate)   02/11/19 0248   Overarching Goals (Adult)   Develops/Participates in Therapeutic [place] to Support Successful Transition making progress toward outcome       Problem: Suicidal Behavior (Adult)  Goal: Suicidal Behavior is Absent/Minimized/Managed  Outcome: Ongoing (interventions implemented as appropriate)   02/11/19 0248   Suicidal Behavior is Absent/Minimized/Managed   Suicidal Behavior Managed/Minimized Action  Step (STG) Outcome making progress toward outcome       Problem: Mood Impairment (Depressive Signs/Symptoms) (Adult)  Goal: Improved Mood Symptoms (Depressive Signs/Symptoms)  Outcome: Ongoing (interventions implemented as appropriate)   02/11/19 0248   Improved Mood Symptoms (Depressive Signs/Symptoms)   Improved Mood Symptoms Action Step (STG) Outcome making progress toward outcome       Problem: Feelings of Worthlessness, Hopelessness, Excessive Guilt (Depressive Signs/Symptoms) (Adult)  Goal: Enhanced Self-Esteem/Confidence (Depressive Signs/Symptoms)  Outcome: Ongoing (interventions implemented as appropriate)   02/11/19 0248   Enhanced Self-Esteem/Confidence (Depressive Signs/Symptoms)   Enhanced Self-Esteem/Confidence Action Step (STG) Outcome making progress toward outcome       Problem: Decreased Participation/Engagement (Depressive Signs/Symptoms) (Adult)  Goal: Increased Participation/Engagement (Depressive Signs/Symptoms)  Outcome: Ongoing (interventions implemented as appropriate)   02/11/19 0248   Increased Participation/Engagement (Depressive Signs/Symptoms)   Increased Participation/Engagement Action Step (STG) Outcome making progress toward outcome       Problem: Sleep Impairment (Depressive Signs/Symptoms) (Adult)  Goal: Improved Sleep Hygiene (Depressive Signs/Symptoms)  Outcome: Ongoing (interventions implemented as appropriate)   02/11/19 0248   Improved Sleep Hygiene (Depressive Signs/Symptoms)   Improved Sleep Hygiene Action Step (STG) Outcome making progress toward outcome       Problem: Social/Occupational/Functional Impairment (Depressive Signs/Symptoms) (Adult)  Goal: Improved Social/Occupational/Functional Skills (Depressive Signs/Symptoms)  Outcome: Ongoing (interventions implemented as appropriate)   02/11/19 0248   Improved Social/Occupational/Functional Skills (Depressive Signs/Symptoms)   Improved Social/Occupational/Functional Skills Action Step (STG) Outcome making progress toward  outcome       Problem: Energy/Vigor Impairment (Depressive Signs/Symptoms) (Adult)  Goal: Improved Energy/Vigor (Depressive Signs/Symptoms)  Outcome: Ongoing (interventions implemented as appropriate)   02/11/19 0248   Improved Energy/Vigor (Depressive Signs/Symptoms)   Improved Energy/Vigor Action Step (STG) Outcome making progress toward outcome

## 2019-02-11 NOTE — PLAN OF CARE
Problem: Patient Care Overview  Goal: Individualization and Mutuality  Outcome: Ongoing (interventions implemented as appropriate)    Goal: Interprofessional Rounds/Family Conf  Outcome: Ongoing (interventions implemented as appropriate)   02/11/19 1236   Interdisciplinary Rounds/Family Conf   Participants ;psychiatrist;nursing;pharmacy;social work   Interdisciplinary Rounds/Family Conf   Summary Treatment team met to create plan of care. Pt will receive a family session and need for individual session will be assessed. Pt will be encouraged to participate in daily therapeutic groups and classes. SW will explore pt's willingness to stepdown to [facility] psych IOP following d/c from [facility].      Patient/Guardian Signature: __________________________________            Psychiatrist Signature: ______________________________________             Therapist Signature: ________________________________________         Nurse Signature: ___________________________________________          Staff Signature: ____________________________________________            Staff Signature: ____________________________________________          Staff Signature: ____________________________________________          Staff Signature:                                                                                                      Problem: Suicidal Behavior (Adult)  Goal: Suicidal Behavior is Absent/Minimized/Managed  Outcome: Ongoing (interventions implemented as appropriate)      Problem: Mood Impairment (Depressive Signs/Symptoms) (Adult)  Goal: Improved Mood Symptoms (Depressive Signs/Symptoms)  Outcome: Ongoing (interventions implemented as appropriate)      Problem: Feelings of Worthlessness, Hopelessness, Excessive Guilt (Depressive Signs/Symptoms) (Adult)  Goal: Enhanced Self-Esteem/Confidence (Depressive Signs/Symptoms)  Outcome: Ongoing (interventions implemented as appropriate)      Problem: Decreased  Participation/Engagement (Depressive Signs/Symptoms) (Adult)  Goal: Increased Participation/Engagement (Depressive Signs/Symptoms)  Outcome: Ongoing (interventions implemented as appropriate)      Problem: Sleep Impairment (Depressive Signs/Symptoms) (Adult)  Goal: Improved Sleep Hygiene (Depressive Signs/Symptoms)  Outcome: Ongoing (interventions implemented as appropriate)      Problem: Social/Occupational/Functional Impairment (Depressive Signs/Symptoms) (Adult)  Goal: Improved Social/Occupational/Functional Skills (Depressive Signs/Symptoms)  Outcome: Ongoing (interventions implemented as appropriate)      Problem: Energy/Vigor Impairment (Depressive Signs/Symptoms) (Adult)  Goal: Improved Energy/Vigor (Depressive Signs/Symptoms)  Outcome: Ongoing (interventions implemented as appropriate)

## 2019-02-11 NOTE — PROGRESS NOTES
Continued Stay Note  [facility]     Patient Name: Alexandra Merchant  MRN: [id number]  Today's Date: 2/11/2019    Admit Date: 2/9/2019    Discharge Plan     Row Name 02/11/19 1238       Plan    Plan Comments SW met with pt to review treatment. Pt verified demographic and pharmacy information. Explained role of CCP. Pt stated that she would like to follow the recommendations provided by Dr. Varela, who is also her current outpt psychiatrist, regarding a family session with her spouse and stepping down to [facility]'s psych IOP following discharge. YURIDIA will continue to collaborate with pt to determine appropriate after-care.     Row Name 02/11/19 1234       Plan    Plan  Pt will return home with spouse. Pt will receive a family session and the need for a 1:1 session will be assessed. YURIDIA will explore outpt providers for mental health prior to d/c from [facility].     Patient/Family in Agreement with Plan  yes        Discharge Codes    No documentation.             June Meeks LCSW

## 2019-02-11 NOTE — PLAN OF CARE
Problem: Patient Care Overview  Goal: Plan of Care Review  Outcome: Ongoing (interventions implemented as appropriate)   02/11/19 1512   OTHER   Outcome Summary paatient has been experiencing on and off again anxiety and depression. she denies current thoughts of SI. Pwatient remains one to one with sitter.   Coping/Psychosocial   Plan of Care Reviewed With patient   Coping/Psychosocial   Patient Agreement with Plan of Care agrees   Plan of Care Review   Progress no change       Problem: Suicidal Behavior (Adult)  Intervention: Provide Immediate/Ongoing Protective Physical Environment   02/11/19 1512   Provide Immediate/Ongoing Protective Physical Environment   Mutually Determined Action Steps (Provide Immediate/Ongoing Protective Physical Environment) verbalizes safety check rationale;shares suicidal thoughts;identifies home safety strategy         Problem: Feelings of Worthlessness, Hopelessness, Excessive Guilt (Depressive Signs/Symptoms) (Adult)  Intervention: Promote Confidence and Self-Esteem   02/11/19 1512   Promote Confidence and Self-Esteem   Mutually Determined Action Steps (Promote Confidence and Self-Esteem) identifies judgmental thoughts;verbalizes successes   Individualized Action Step (Promote Confidence and Self-Esteem) state one positive thing about self   Coping/Psychosocial Interventions   Supportive Measures active listening utilized;counseling provided;decision-making supported;self-care encouraged;relaxation techniques promoted;verbalization of feelings encouraged         Problem: Sleep Impairment (Depressive Signs/Symptoms) (Adult)  Intervention: Promote Healthy Sleep Hygiene   02/11/19 1512   Promote Healthy Sleep Hygiene   Mutually Determined Action Steps (Promote Healthy Sleep Hygiene) sleeps 4-6 hours at night;uses relaxation techniques   Promote Health Sleep Hygiene   Sleep Hygiene Promotion napping discouraged;noise level reduced;reading encouraged;regular sleep pattern  promoted;relaxation techniques promoted;room lighting adjusted

## 2019-02-11 NOTE — PLAN OF CARE
Problem: Patient Care Overview  Goal: Plan of Care Review  Outcome: Ongoing (interventions implemented as appropriate)   02/10/19 2012   OTHER   Outcome Summary PT arted anxiety 9/10, depression 9/10, expressed SI but, agreed to contract for safety if she felt lack acting upon SI. Denies HI. Verbalized hearing voices last night of unknown origin. HSP in affect with 1:1 sitter at bedside. received home meds from parents this afternoon. 1320 HRS: This RN called into room by KALLIE Toussaint to see PT who had been coughing. PT did not appear in distress, but would not answer questions and provided limit speech as she continued from earlier . PT did receive two respiratory treatments about an hour prior. NT was instructed to continue monitor PT and inform this RN of any changes. 1335 HRS: NT called out for assistance with PT after she discovered that PT had a sock wrapped around her neck and underneath her turtle neck sweater and pulling down on it. Sock was immediately removed from her neck, neck was redden but no broken skin.. Dr. Varela into evaluate PT. Vital signs stable and O 2 Sat 96%  All personal clothes removed, three blankets removed from bed that was covering PT. Diet changed to finger food and PT will dine in room.  informed and [facility] supervisor Samantha Ahn . Safety report completed. PT informed this RN that she doesn't want her  to have her privacy code. PT's father called and informed of incident. Will continue to monitor behavior and provide support.   Coping/Psychosocial   Plan of Care Reviewed With patient   Coping/Psychosocial   Patient Agreement with Plan of Care agrees   Plan of Care Review   Progress declining     Goal: Individualization and Mutuality  Outcome: Ongoing (interventions implemented as appropriate)   02/10/19 2012   Personal Strengths/Vulnerabilities   Patient Personal Strengths expressive of needs;family/social support;medication/treatment adherence     Goal: Discharge  Needs Assessment  Outcome: Ongoing (interventions implemented as appropriate)    Goal: Interprofessional Rounds/Family Conf  Outcome: Ongoing (interventions implemented as appropriate)      Problem: Overarching Goals (Adult)  Goal: Adheres to Safety Considerations for Self and Others  Outcome: Ongoing (interventions implemented as appropriate)   02/10/19 2012   Overarching Goals (Adult)   Adheres to Safety Considerations for Self and Others (Attempted self harm this shift)     Goal: Optimized Coping Skills in Response to Life Stressors  Outcome: Ongoing (interventions implemented as appropriate)   02/10/19 2012   Overarching Goals (Adult)   Optimized Coping Skills in Response to Life Stressors other (see comments)  (no progress)     Goal: Develops/Participates in Therapeutic [place] to Support Successful Transition  Outcome: Ongoing (interventions implemented as appropriate)      Problem: Suicidal Behavior (Adult)  Goal: Suicidal Behavior is Absent/Minimized/Managed  Outcome: Ongoing (interventions implemented as appropriate)

## 2019-02-11 NOTE — PLAN OF CARE
Problem: Patient Care Overview  Goal: Discharge Needs Assessment  Outcome: Ongoing (interventions implemented as appropriate)   02/11/19 1233 02/11/19 1235   Discharge Needs Assessment   Readmission Within the Last 30 Days no previous admission in last 30 days --    Concerns to be Addressed decision making;coping/stress;cognitive/perceptual;mental health;medication;relationship;suicidal --    Patient/Family Anticipates Transition to home with family --    Patient/Family Anticipated Services at Transition mental health services --    Transportation Anticipated family or friend will provide;car, drives self --    Discharge Coordination/Progress --  Pt will return home with spouse. Pt will receive a family session and the need for a 1:1 session will be assessed. SW will explore outpt providers for mental health prior to d/c from [facility].

## 2019-02-11 NOTE — PROGRESS NOTES
The patient remains on one-to-one precautions given yesterday's events.  She continues to endorse positive suicidal ideation and hopelessness.  We await a family therapy and individual therapy sessions and continue aggressive pharmacotherapy as per Dr. Varela direction.

## 2019-02-12 PROCEDURE — 94799 UNLISTED PULMONARY SVC/PX: CPT

## 2019-02-12 RX ADMIN — BUDESONIDE AND FORMOTEROL FUMARATE DIHYDRATE 2 PUFF: 160; 4.5 AEROSOL RESPIRATORY (INHALATION) at 08:58

## 2019-02-12 RX ADMIN — LAMOTRIGINE 200 MG: 100 TABLET ORAL at 09:00

## 2019-02-12 RX ADMIN — DIAZEPAM 10 MG: 5 TABLET ORAL at 00:08

## 2019-02-12 RX ADMIN — DIAZEPAM 10 MG: 5 TABLET ORAL at 22:32

## 2019-02-12 RX ADMIN — DIAZEPAM 10 MG: 5 TABLET ORAL at 14:28

## 2019-02-12 NOTE — PLAN OF CARE
Problem: Patient Care Overview  Goal: Plan of Care Review  Outcome: Ongoing (interventions implemented as appropriate)   02/12/19 0200 02/12/19 0336   OTHER   Outcome Summary --  Pt has been hyper verbal with rapid speech this shift and currently denies SI/HI/AVH. Pt did report that she had been hearing voices telling her to jump out the window but is currently not hearing them. Pt is preoccupied with her 72 hour hold status and overly focused on discharge. Pt continues to want to avoid giving her  the access code for the unit. No new self inflicted wounds were noted this shift. Sitter remains for safety. 2/11/19 0341   Coping/Psychosocial   Plan of Care Reviewed With patient --    Coping/Psychosocial   Patient Agreement with Plan of Care agrees --    Plan of Care Review   Progress --  improving       Problem: Overarching Goals (Adult)  Goal: Adheres to Safety Considerations for Self and Others  Outcome: Ongoing (interventions implemented as appropriate)   02/12/19 0336   Overarching Goals (Adult)   Adheres to Safety Considerations for Self and Others making progress toward outcome     Goal: Optimized Coping Skills in Response to Life Stressors  Outcome: Ongoing (interventions implemented as appropriate)   02/12/19 0336   Overarching Goals (Adult)   Optimized Coping Skills in Response to Life Stressors making progress toward outcome     Goal: Develops/Participates in Therapeutic [place] to Support Successful Transition  Outcome: Ongoing (interventions implemented as appropriate)   02/12/19 0336   Overarching Goals (Adult)   Develops/Participates in Therapeutic [place] to Support Successful Transition making progress toward outcome       Problem: Suicidal Behavior (Adult)  Goal: Suicidal Behavior is Absent/Minimized/Managed  Outcome: Ongoing (interventions implemented as appropriate)   02/12/19 0336   Suicidal Behavior is Absent/Minimized/Managed   Suicidal Behavior Managed/Minimized Action Step (STG)  Outcome making progress toward outcome       Problem: Mood Impairment (Depressive Signs/Symptoms) (Adult)  Goal: Improved Mood Symptoms (Depressive Signs/Symptoms)  Outcome: Ongoing (interventions implemented as appropriate)   02/12/19 0336   Improved Mood Symptoms (Depressive Signs/Symptoms)   Improved Mood Symptoms Action Step (STG) Outcome making progress toward outcome

## 2019-02-12 NOTE — PROGRESS NOTES
Continued Stay Note  [facility]     Patient Name: Alexandra Merchant  MRN: [id number]  Today's Date: 2/12/2019    Admit Date: 2/9/2019    Discharge Plan     Row Name 02/12/19 1223       Plan    Plan Comments YURIDIA spoke with pt's father, Julio Cesar Snyder [phone], to review treatment. Pt's father expressed his concern for pt's current mental status and is concerned about whether the pt has exprienced a high level of stress with her marital relationship and caring for her children. SW encouraged the pt's father to ask these questions during a family session with pt for more clarity. He also mentioned being a primary source of support, outside of her spouse, to the pt and is available anytime the pt will need him.         Discharge Codes    No documentation.             June Meeks LCSW

## 2019-02-12 NOTE — PLAN OF CARE
Problem: Patient Care Overview  Goal: Plan of Care Review  Outcome: Ongoing (interventions implemented as appropriate)   02/12/19 1654   OTHER   Outcome Summary Pt remains hyperverbal with the sitter at bedside. Has not come out of her room except for some programming. Received Valium after the 2pm group when there were triggers and topics that were causing anxiety. Pt has not made any attempts to harm self or threaten to harm self. No SI.    Coping/Psychosocial   Plan of Care Reviewed With patient   Coping/Psychosocial   Patient Agreement with Plan of Care agrees   Plan of Care Review   Progress no change       Problem: Overarching Goals (Adult)  Goal: Adheres to Safety Considerations for Self and Others  Outcome: Ongoing (interventions implemented as appropriate)   02/12/19 1654   Overarching Goals (Adult)   Adheres to Safety Considerations for Self and Others making progress toward outcome     Goal: Optimized Coping Skills in Response to Life Stressors  Outcome: Ongoing (interventions implemented as appropriate)   02/12/19 1654   Overarching Goals (Adult)   Optimized Coping Skills in Response to Life Stressors making progress toward outcome     Goal: Develops/Participates in Therapeutic [place] to Support Successful Transition  Outcome: Ongoing (interventions implemented as appropriate)   02/12/19 1654   Overarching Goals (Adult)   Develops/Participates in Therapeutic [place] to Support Successful Transition making progress toward outcome       Problem: Suicidal Behavior (Adult)  Goal: Suicidal Behavior is Absent/Minimized/Managed  Outcome: Ongoing (interventions implemented as appropriate)   02/12/19 1654   OTHER   Action Step/Short Term Goal (STG) Established 02/10/19   Suicidal Behavior is Absent/Minimized/Managed   Suicidal Behavior Managed/Minimized Time Frame for Action Step (STG) 2 days   Suicidal Behavior Managed/Minimized Action Step (STG) Outcome making progress toward outcome       Problem: Mood  Impairment (Depressive Signs/Symptoms) (Adult)  Goal: Improved Mood Symptoms (Depressive Signs/Symptoms)   02/12/19 1654   Improved Mood Symptoms (Depressive Signs/Symptoms)   Improved Mood Symptoms Action Step/Short Term Goal (STG) Established 02/10/19   Improved Mood Symptoms Time Frame for Action Step (STG) 2 days   Improved Mood Symptoms Action Step (STG) Outcome making progress toward outcome       Problem: Feelings of Worthlessness, Hopelessness, Excessive Guilt (Depressive Signs/Symptoms) (Adult)  Goal: Enhanced Self-Esteem/Confidence (Depressive Signs/Symptoms)  Outcome: Ongoing (interventions implemented as appropriate)   02/12/19 1654   Enhanced Self-Esteem/Confidence (Depressive Signs/Symptoms)   Enhanced Self-Esteem/Confidence Action Step/Short Term Goal (STG) Established 02/10/19   Enhanced Self-Esteem/Confidence Time Frame for Action Step (STG) 2 days   Enhanced Self-Esteem/Confidence Action Step (STG) Outcome making progress toward outcome       Problem: Decreased Participation/Engagement (Depressive Signs/Symptoms) (Adult)  Goal: Increased Participation/Engagement (Depressive Signs/Symptoms)  Outcome: Ongoing (interventions implemented as appropriate)   02/12/19 1654   Increased Participation/Engagement (Depressive Signs/Symptoms)   Increased Participation/Engagement Action Step/Short Term Goal (STG) Established 02/10/19   Increased Participation/Engagement Time Frame for Action Step (STG) 2 days   Increased Participation/Engagement Action Step (STG) Outcome making progress toward outcome       Problem: Sleep Impairment (Depressive Signs/Symptoms) (Adult)  Goal: Improved Sleep Hygiene (Depressive Signs/Symptoms)  Outcome: Ongoing (interventions implemented as appropriate)   02/12/19 1654   Improved Sleep Hygiene (Depressive Signs/Symptoms)   Improved Sleep Hygiene Action Step/Short Term Goal (STG) Established 02/10/19   Improved Sleep Hygiene Time Frame for Action Step (STG) 2 days   Improved Sleep  Hygiene Action Step (STG) Outcome making progress toward outcome       Problem: Social/Occupational/Functional Impairment (Depressive Signs/Symptoms) (Adult)  Goal: Improved Social/Occupational/Functional Skills (Depressive Signs/Symptoms)  Outcome: Ongoing (interventions implemented as appropriate)   02/12/19 1654   Improved Social/Occupational/Functional Skills (Depressive Signs/Symptoms)   Improved Social/Occupational/Functional Skills Action Step/Short Term Goal (STG) Established 02/10/19   Improved Social/Occupational/Functional Skills Time Frame for Action Step (STG) 2 days   Improved Social/Occupational/Functional Skills Action Step (STG) Outcome making progress toward outcome       Problem: Energy/Vigor Impairment (Depressive Signs/Symptoms) (Adult)  Goal: Improved Energy/Vigor (Depressive Signs/Symptoms)  Outcome: Ongoing (interventions implemented as appropriate)   02/12/19 1654   Improved Energy/Vigor (Depressive Signs/Symptoms)   Improved Energy/Vigor Action Step/Short Term Goal (STG) Established 02/10/19   Improved Energy/Vigor Time Frame for Action Step (STG) 2 days   Improved Energy/Vigor Action Step (STG) Outcome making progress toward outcome

## 2019-02-12 NOTE — PROGRESS NOTES
The patient continues to voice positive suicidal ideation and is equivocal daily when queried regarding her ability to promise safety in the hospital.  Accordingly, we will leave a sitter with the patient given previous behaviors on the unit.  In the meantime we are continuing current pharmacotherapy and I will encourage the patient to increase her purchase patient within therapeutic milieu.  We await a family therapy session.

## 2019-02-13 PROCEDURE — 94799 UNLISTED PULMONARY SVC/PX: CPT

## 2019-02-13 RX ADMIN — ACETAMINOPHEN 650 MG: 325 TABLET, FILM COATED ORAL at 16:15

## 2019-02-13 RX ADMIN — LAMOTRIGINE 200 MG: 100 TABLET ORAL at 08:24

## 2019-02-13 RX ADMIN — DIAZEPAM 10 MG: 5 TABLET ORAL at 12:46

## 2019-02-13 RX ADMIN — BUDESONIDE AND FORMOTEROL FUMARATE DIHYDRATE 2 PUFF: 160; 4.5 AEROSOL RESPIRATORY (INHALATION) at 21:22

## 2019-02-13 NOTE — CONSULTS
An MARGIE consult was called in due to the positive UDS for Benzos and Amphetamine.  No other indication of drug use in the chart.  Met with the pharmacist this am who advised that patient's prescribed medications could account for the positive UDS-Valium and Trintellix.

## 2019-02-13 NOTE — PROGRESS NOTES
Clinical Pharmacy Services: Medication History    Alexandra Merchant is a 33 y.o. female presenting to [facility] for Major depressive disorder, recurrent severe without psychotic features (CMS/HCC) [F33.2]    She  has a past medical history of Bipolar disorder (CMS/HCC) and Depression.    Allergies as of 02/09/2019   • (No Known Allergies)     Medication information was obtained from: Pharmacy  Pharmacy and Phone Number: [facility] ([phone])    Prior to Admission Medications     Prescriptions Last Dose Informant Patient Reported? Taking?    Cariprazine HCl (VRAYLAR) 1.5 MG capsule capsule   Yes Yes    Take 1.5 mg by mouth Daily.    diazePAM (VALIUM) 10 MG tablet   Yes Yes    Take 10 mg by mouth Every 8 (Eight) Hours As Needed for Anxiety.    lamoTRIgine (LaMICtal) 100 MG tablet   Yes Yes    Take 200 mg by mouth Daily.    lisdexamfetamine (VYVANSE) 60 MG capsule   Yes Yes    Take 60 mg by mouth Every Morning    Vortioxetine HBr (TRINTELLIX) 10 MG tablet   Yes Yes    Take  by mouth daily     Medication notes: None    This medication list is complete to the best of my knowledge as of 2/13/2019    Please call if questions.    Jodie Hood, Pharm.D., [facility]  Clinical Pharmacist  Phone Extension #[phone]  2/13/2019 10:42 AM

## 2019-02-13 NOTE — PLAN OF CARE
"Problem: Patient Care Overview  Goal: Plan of Care Review  Outcome: Ongoing (interventions implemented as appropriate)   02/13/19 0000 02/13/19 0422   OTHER   Outcome Summary --  Pt remains hyperverbal with some attention seeking behaviors noted this shift. Pt reports that  came to visitation and all went well. Pt denies SI/HI and rates both anxiety and depression 5/10. Pt denies hallucinations but reports that she normally has auditory hallucinations at bedtime when she is \"trying to quiet down\". Pt reports that \"Dr. Varela made me go to groups because I did not want to go\". Pt reports that she was triggered at the afternoon group because they were drawing bridges and a bridge was what she had been contemplating jumping from . Pt reports that she is trying to get disability for her mental illness. No SI or self inflicted wounds were noted this shift. Sitter remains for safety. Pt continues to be inappropriately focused on discharge. 2/13/19 0428    Coping/Psychosocial   Plan of Care Reviewed With patient --    Coping/Psychosocial   Patient Agreement with Plan of Care agrees --    Plan of Care Review   Progress --  no change       Problem: Overarching Goals (Adult)  Goal: Adheres to Safety Considerations for Self and Others  Outcome: Ongoing (interventions implemented as appropriate)   02/13/19 0422   Overarching Goals (Adult)   Adheres to Safety Considerations for Self and Others making progress toward outcome     Goal: Optimized Coping Skills in Response to Life Stressors  Outcome: Ongoing (interventions implemented as appropriate)   02/13/19 0422   Overarching Goals (Adult)   Optimized Coping Skills in Response to Life Stressors making progress toward outcome     Goal: Develops/Participates in Therapeutic [place] to Support Successful Transition  Outcome: Ongoing (interventions implemented as appropriate)   02/13/19 0422   Overarching Goals (Adult)   Develops/Participates in Therapeutic [place] to Support " Successful Transition making progress toward outcome       Problem: Suicidal Behavior (Adult)  Goal: Suicidal Behavior is Absent/Minimized/Managed  Outcome: Ongoing (interventions implemented as appropriate)   02/13/19 0422   Suicidal Behavior is Absent/Minimized/Managed   Suicidal Behavior Managed/Minimized Action Step (STG) Outcome making progress toward outcome       Problem: Mood Impairment (Depressive Signs/Symptoms) (Adult)  Goal: Improved Mood Symptoms (Depressive Signs/Symptoms)  Outcome: Ongoing (interventions implemented as appropriate)   02/13/19 0422   Improved Mood Symptoms (Depressive Signs/Symptoms)   Improved Mood Symptoms Action Step (STG) Outcome making progress toward outcome

## 2019-02-13 NOTE — PLAN OF CARE
Problem: Patient Care Overview  Goal: Plan of Care Review  Outcome: Ongoing (interventions implemented as appropriate)   02/13/19 1814   OTHER   Outcome Summary Pt has participated in programming today. Has done well once her 1:1 sitter was discontinued. Pt stated today that the visit with  didn't go as well as she had hoped. Pt still quite talkative. Anxious early afternoon and received Valium . Withdrawn to room when not in programming Hoping to leave soon and pt thinks she is on a 72 hour hold which she is not.    Coping/Psychosocial   Plan of Care Reviewed With patient   Coping/Psychosocial   Patient Agreement with Plan of Care agrees   Plan of Care Review   Progress improving       Problem: Overarching Goals (Adult)  Goal: Adheres to Safety Considerations for Self and Others  Outcome: Ongoing (interventions implemented as appropriate)   02/13/19 1814   Overarching Goals (Adult)   Adheres to Safety Considerations for Self and Others making progress toward outcome     Goal: Optimized Coping Skills in Response to Life Stressors  Outcome: Ongoing (interventions implemented as appropriate)   02/13/19 1814   Overarching Goals (Adult)   Optimized Coping Skills in Response to Life Stressors making progress toward outcome     Goal: Develops/Participates in Therapeutic [place] to Support Successful Transition  Outcome: Ongoing (interventions implemented as appropriate)   02/13/19 1814   Overarching Goals (Adult)   Develops/Participates in Therapeutic [place] to Support Successful Transition making progress toward outcome       Problem: Suicidal Behavior (Adult)  Goal: Suicidal Behavior is Absent/Minimized/Managed  Outcome: Ongoing (interventions implemented as appropriate)   02/13/19 1814   OTHER   Action Step/Short Term Goal (STG) Established 02/10/19   Suicidal Behavior is Absent/Minimized/Managed   Suicidal Behavior Managed/Minimized Time Frame for Action Step (STG) 1 day   Suicidal Behavior Managed/Minimized  Action Step (STG) Outcome making progress toward outcome       Problem: Mood Impairment (Depressive Signs/Symptoms) (Adult)  Goal: Improved Mood Symptoms (Depressive Signs/Symptoms)  Outcome: Ongoing (interventions implemented as appropriate)   02/13/19 1814   Improved Mood Symptoms (Depressive Signs/Symptoms)   Improved Mood Symptoms Action Step/Short Term Goal (STG) Established 02/10/19   Improved Mood Symptoms Time Frame for Action Step (STG) 1 day   Improved Mood Symptoms Action Step (STG) Outcome making progress toward outcome       Problem: Feelings of Worthlessness, Hopelessness, Excessive Guilt (Depressive Signs/Symptoms) (Adult)  Goal: Enhanced Self-Esteem/Confidence (Depressive Signs/Symptoms)  Outcome: Ongoing (interventions implemented as appropriate)   02/13/19 1814   Enhanced Self-Esteem/Confidence (Depressive Signs/Symptoms)   Enhanced Self-Esteem/Confidence Action Step/Short Term Goal (STG) Established 02/10/19   Enhanced Self-Esteem/Confidence Time Frame for Action Step (STG) 1 day   Enhanced Self-Esteem/Confidence Action Step (STG) Outcome making progress toward outcome       Problem: Decreased Participation/Engagement (Depressive Signs/Symptoms) (Adult)  Goal: Increased Participation/Engagement (Depressive Signs/Symptoms)  Outcome: Ongoing (interventions implemented as appropriate)   02/13/19 1814   Increased Participation/Engagement (Depressive Signs/Symptoms)   Increased Participation/Engagement Action Step/Short Term Goal (STG) Established 02/10/19   Increased Participation/Engagement Time Frame for Action Step (STG) 1 day   Increased Participation/Engagement Action Step (STG) Outcome making progress toward outcome       Problem: Sleep Impairment (Depressive Signs/Symptoms) (Adult)  Goal: Improved Sleep Hygiene (Depressive Signs/Symptoms)  Outcome: Ongoing (interventions implemented as appropriate)   02/13/19 1814   Improved Sleep Hygiene (Depressive Signs/Symptoms)   Improved Sleep Hygiene  Action Step/Short Term Goal (STG) Established 02/10/19   Improved Sleep Hygiene Time Frame for Action Step (STG) 1 day   Improved Sleep Hygiene Action Step (STG) Outcome making progress toward outcome       Problem: Social/Occupational/Functional Impairment (Depressive Signs/Symptoms) (Adult)  Goal: Improved Social/Occupational/Functional Skills (Depressive Signs/Symptoms)  Outcome: Ongoing (interventions implemented as appropriate)   02/13/19 1814   Improved Social/Occupational/Functional Skills (Depressive Signs/Symptoms)   Improved Social/Occupational/Functional Skills Action Step/Short Term Goal (STG) Established 02/10/19   Improved Social/Occupational/Functional Skills Time Frame for Action Step (STG) 1 day   Improved Social/Occupational/Functional Skills Action Step (STG) Outcome making progress toward outcome       Problem: Energy/Vigor Impairment (Depressive Signs/Symptoms) (Adult)  Goal: Improved Energy/Vigor (Depressive Signs/Symptoms)  Outcome: Ongoing (interventions implemented as appropriate)   02/13/19 1814   Improved Energy/Vigor (Depressive Signs/Symptoms)   Improved Energy/Vigor Action Step/Short Term Goal (STG) Established 02/10/19   Improved Energy/Vigor Time Frame for Action Step (STG) 1 day   Improved Energy/Vigor Action Step (STG) Outcome making progress toward outcome

## 2019-02-13 NOTE — PROGRESS NOTES
The patient is reporting reduction in suicidal ideation and is able to promise safety in the hospital now.  We will discontinue her sitter and one-to-one precautions, reducing her suicide precautions to low risk.  We continue current pharmacotherapy and I have encouraged patient to increase her participation within the therapeutic milieu.  A family therapy session is pending.

## 2019-02-14 PROCEDURE — 94799 UNLISTED PULMONARY SVC/PX: CPT

## 2019-02-14 RX ORDER — LAMOTRIGINE 100 MG/1
200 TABLET ORAL NIGHTLY
Status: DISCONTINUED | OUTPATIENT
Start: 2019-02-14 | End: 2019-02-15 | Stop reason: HOSPADM

## 2019-02-14 RX ADMIN — BUDESONIDE AND FORMOTEROL FUMARATE DIHYDRATE 2 PUFF: 160; 4.5 AEROSOL RESPIRATORY (INHALATION) at 07:48

## 2019-02-14 RX ADMIN — LAMOTRIGINE 200 MG: 100 TABLET ORAL at 21:02

## 2019-02-14 RX ADMIN — ACETAMINOPHEN 650 MG: 325 TABLET, FILM COATED ORAL at 18:12

## 2019-02-14 RX ADMIN — DIAZEPAM 10 MG: 5 TABLET ORAL at 23:08

## 2019-02-14 RX ADMIN — DIAZEPAM 10 MG: 5 TABLET ORAL at 12:48

## 2019-02-14 RX ADMIN — BUDESONIDE AND FORMOTEROL FUMARATE DIHYDRATE 2 PUFF: 160; 4.5 AEROSOL RESPIRATORY (INHALATION) at 20:04

## 2019-02-14 RX ADMIN — LAMOTRIGINE 200 MG: 100 TABLET ORAL at 08:32

## 2019-02-14 NOTE — PLAN OF CARE
Problem: Patient Care Overview  Goal: Interprofessional Rounds/Family Conf  Outcome: Ongoing (interventions implemented as appropriate)   02/14/19 1017   Interdisciplinary Rounds/Family Conf   Participants art therapy;;psychiatrist;social work;nursing   Interdisciplinary Rounds/Family Conf   Summary Treatment team met to review plan of care. Pt will have a family session with spouse and anticipated to discharge, 02/15, pending ongoing progress in treatment. Pt plans to stepdown to [facility]'s psych IOP following discharge from [facility].     Patient/Guardian Signature: __________________________________            Psychiatrist Signature: ______________________________________             Therapist Signature: ________________________________________         Nurse Signature: ___________________________________________          Staff Signature: ____________________________________________            Staff Signature: ____________________________________________          Staff Signature: ____________________________________________          Staff Signature:                                                                                                      Problem: Mood Impairment (Depressive Signs/Symptoms) (Adult)  Goal: Improved Mood Symptoms (Depressive Signs/Symptoms)  Outcome: Ongoing (interventions implemented as appropriate)      Problem: Feelings of Worthlessness, Hopelessness, Excessive Guilt (Depressive Signs/Symptoms) (Adult)  Goal: Enhanced Self-Esteem/Confidence (Depressive Signs/Symptoms)  Outcome: Ongoing (interventions implemented as appropriate)      Problem: Decreased Participation/Engagement (Depressive Signs/Symptoms) (Adult)  Goal: Increased Participation/Engagement (Depressive Signs/Symptoms)  Outcome: Ongoing (interventions implemented as appropriate)      Problem: Sleep Impairment (Depressive Signs/Symptoms) (Adult)  Goal: Improved Sleep Hygiene (Depressive Signs/Symptoms)  Outcome: Ongoing  (interventions implemented as appropriate)      Problem: Social/Occupational/Functional Impairment (Depressive Signs/Symptoms) (Adult)  Goal: Improved Social/Occupational/Functional Skills (Depressive Signs/Symptoms)  Outcome: Ongoing (interventions implemented as appropriate)      Problem: Energy/Vigor Impairment (Depressive Signs/Symptoms) (Adult)  Goal: Improved Energy/Vigor (Depressive Signs/Symptoms)  Outcome: Ongoing (interventions implemented as appropriate)

## 2019-02-14 NOTE — PLAN OF CARE
Problem: Patient Care Overview  Goal: Plan of Care Review  Outcome: Ongoing (interventions implemented as appropriate)   02/14/19 1542   OTHER   Outcome Summary Pt. participated all unit activities. She has been social with peers and staff.Patient denies any thoughts of self harm and has denied any self mutilating behaviors. Patient requested PRN for anxiety (see MAR) before family session with her . Discussed after discharge plans with patient and gave patient educational material on PTSD and what to do when feeling suicidal.   Coping/Psychosocial   Plan of Care Reviewed With patient   Coping/Psychosocial   Patient Agreement with Plan of Care agrees   Plan of Care Review   Progress improving       Problem: Decreased Participation/Engagement (Depressive Signs/Symptoms) (Adult)  Intervention: Facilitate Participation and Engagement   02/14/19 1542   Facilitate Participation and Engagement   Mutually Determined Action Steps (Facilitate Participation and Engagement) participates in one or more activity;initiates interaction with others   Activity   Activity activity encouraged;up ad rola         Problem: Sleep Impairment (Depressive Signs/Symptoms) (Adult)  Intervention: Promote Healthy Sleep Hygiene   02/14/19 1542   Promote Healthy Sleep Hygiene   Mutually Determined Action Steps (Promote Healthy Sleep Hygiene) sleeps 4-6 hours at night;identifies disturbance factors;remains out of bed during day   Promote Health Sleep Hygiene   Sleep Hygiene Promotion awakenings minimized;napping discouraged;reading encouraged;relaxation techniques promoted;room lighting adjusted;regular sleep pattern promoted

## 2019-02-14 NOTE — PROGRESS NOTES
Continued Stay Note  [facility]     Patient Name: Alexandra Merchant  MRN: [id number]  Today's Date: 2/14/2019    Admit Date: 2/9/2019    Discharge Plan     Row Name 02/14/19 1257       Plan    Plan Comments YURIDIA met with pt to review treatment. Pt stated that she would like to resume outpatient mental health services at [facility] with Dr. Varela for medication mgmt and therapy with Fabio Steinberg in same office. She stated that she has changed her decision about stepping down to [facility]'s psych IOP at this time, but stated that she has support from her parents following her return home. YURIDIA left voice message for Kayce at [facility] [phone] to change follow-up appointments for med mgmt and therapy for sooner dates as current outpt appts are scheduled for mid-March. YURIDIA awaits a response.   **YURIDIA spoke with Kayce at [facility], who stated that due to Dr. Varela being on vacation for 2 weeks, her 03/19 appointment with him could not be scheduled for a sooner appointment, but pt was placed on cancellation list, in case someone cancels appt with Dr. Varela. YURIDIA was able to schedule a sooner appointment with pt's therapist, Fabio Steinberg, in office for 02/20 at 12:00p.         Discharge Codes    No documentation.             June Meeks LCSW

## 2019-02-14 NOTE — PROGRESS NOTES
The patient seems a bit brighter today and was reporting significant reduction in suicidal ideation.  She is scheduled for a family therapy session later today, and is confident that this should do well.  Should go well we will probably looking at a.m. discharge.  In the meantime, there seems to be some controversy as to whether the patient has or has not been taking Lamictal.  Dr. Varela, who is the patient's outpatient psychiatrist and who admitted the patient on Sunday, had stated in his history and physical that the patient was in fact on lamotrigine and that he plan to increase the dose of this medication was 200 mg.

## 2019-02-14 NOTE — PLAN OF CARE
Problem: Patient Care Overview  Goal: Plan of Care Review  Outcome: Ongoing (interventions implemented as appropriate)   02/13/19 2237 02/14/19 0310   OTHER   Outcome Summary --  Pt denied anxiety, denied depression. No thoughts to hurt self or others. Pt presents brighter and hopeful. Pt talked about upcoming family session with . No PRN medications needed this shift. Will continue to provife feedback and support,   Coping/Psychosocial   Plan of Care Reviewed With patient --    Coping/Psychosocial   Patient Agreement with Plan of Care agrees --    Plan of Care Review   Progress --  improving       Problem: Overarching Goals (Adult)  Goal: Adheres to Safety Considerations for Self and Others  Outcome: Ongoing (interventions implemented as appropriate)   02/14/19 0310   Overarching Goals (Adult)   Adheres to Safety Considerations for Self and Others making progress toward outcome     Goal: Optimized Coping Skills in Response to Life Stressors  Outcome: Ongoing (interventions implemented as appropriate)   02/14/19 0310   Overarching Goals (Adult)   Optimized Coping Skills in Response to Life Stressors making progress toward outcome     Goal: Develops/Participates in Therapeutic [place] to Support Successful Transition  Outcome: Ongoing (interventions implemented as appropriate)   02/14/19 0310   Overarching Goals (Adult)   Develops/Participates in Therapeutic [place] to Support Successful Transition making progress toward outcome       Problem: Mood Impairment (Depressive Signs/Symptoms) (Adult)  Goal: Improved Mood Symptoms (Depressive Signs/Symptoms)  Outcome: Ongoing (interventions implemented as appropriate)   02/14/19 0310   Improved Mood Symptoms (Depressive Signs/Symptoms)   Improved Mood Symptoms Action Step (STG) Outcome making progress toward outcome       Problem: Feelings of Worthlessness, Hopelessness, Excessive Guilt (Depressive Signs/Symptoms) (Adult)  Goal: Enhanced Self-Esteem/Confidence  (Depressive Signs/Symptoms)  Outcome: Ongoing (interventions implemented as appropriate)   02/14/19 0310   Enhanced Self-Esteem/Confidence (Depressive Signs/Symptoms)   Enhanced Self-Esteem/Confidence Action Step (STG) Outcome making progress toward outcome       Problem: Decreased Participation/Engagement (Depressive Signs/Symptoms) (Adult)  Goal: Increased Participation/Engagement (Depressive Signs/Symptoms)  Outcome: Ongoing (interventions implemented as appropriate)   02/14/19 0310   Increased Participation/Engagement (Depressive Signs/Symptoms)   Increased Participation/Engagement Action Step (STG) Outcome making progress toward outcome       Problem: Sleep Impairment (Depressive Signs/Symptoms) (Adult)  Goal: Improved Sleep Hygiene (Depressive Signs/Symptoms)  Outcome: Ongoing (interventions implemented as appropriate)   02/14/19 0310   Improved Sleep Hygiene (Depressive Signs/Symptoms)   Improved Sleep Hygiene Action Step (STG) Outcome making progress toward outcome       Problem: Social/Occupational/Functional Impairment (Depressive Signs/Symptoms) (Adult)  Goal: Improved Social/Occupational/Functional Skills (Depressive Signs/Symptoms)  Outcome: Ongoing (interventions implemented as appropriate)   02/14/19 0310   Improved Social/Occupational/Functional Skills (Depressive Signs/Symptoms)   Improved Social/Occupational/Functional Skills Action Step (STG) Outcome making progress toward outcome       Problem: Energy/Vigor Impairment (Depressive Signs/Symptoms) (Adult)  Goal: Improved Energy/Vigor (Depressive Signs/Symptoms)  Outcome: Ongoing (interventions implemented as appropriate)   02/14/19 0310   Improved Energy/Vigor (Depressive Signs/Symptoms)   Improved Energy/Vigor Action Step (STG) Outcome making progress toward outcome

## 2019-02-14 NOTE — SIGNIFICANT NOTE
"Met with pt an  for family session [id number]. Pt and  sat apart and both shared need for better communication.  voiced desire for pt to be \"open, honest and direct\" as  described trying to be. Both shared of difficulty with getting together due to schedules and care of children.  shared anger of pt not talking to him related to a court issue pt had received mail about. Pt shared not want to burden  and fear of making  angry. Pt and  shared couple had not had physical fights with each other and that was not an issue.  asked about the meds as the cabinet at home is full, the reason pt is with the same therapist with 10 years and still has issues and asked if there is hope for the relationship. This therapist encouraged couples sessions on out pt basis and for pt to dispose of all meds pt is not currently needing. Encouraged hope with communication exercises, reading book Getting The Love You Want and having pt practice 30 non-verbal hugs. Pt denied current thoughts of suicide. Pt shared desire to follow up with current therapist with session twice a week once discharged from [facility].   "

## 2019-02-15 VITALS
RESPIRATION RATE: 18 BRPM | HEIGHT: 68 IN | SYSTOLIC BLOOD PRESSURE: 124 MMHG | BODY MASS INDEX: 23.4 KG/M2 | DIASTOLIC BLOOD PRESSURE: 89 MMHG | WEIGHT: 154.38 LBS | OXYGEN SATURATION: 99 % | TEMPERATURE: 97 F | HEART RATE: 81 BPM

## 2019-02-15 PROCEDURE — 94799 UNLISTED PULMONARY SVC/PX: CPT

## 2019-02-15 RX ORDER — LAMOTRIGINE 200 MG/1
200 TABLET ORAL NIGHTLY
Qty: 30 TABLET | Refills: 1 | Status: SHIPPED | OUTPATIENT
Start: 2019-02-15 | End: 2021-06-23

## 2019-02-15 RX ADMIN — BUDESONIDE AND FORMOTEROL FUMARATE DIHYDRATE 2 PUFF: 160; 4.5 AEROSOL RESPIRATORY (INHALATION) at 07:52

## 2019-02-15 NOTE — PLAN OF CARE
Problem: Patient Care Overview  Goal: Plan of Care Review  Outcome: Ongoing (interventions implemented as appropriate)   02/14/19 1542 02/14/19 2221 02/15/19 0256   OTHER   Outcome Summary --  --  Pt denied all issues, no thoughts to hurt self or others. Out in milieu, engaged with staff and peers. Looking forward to going home tomorrow. Pt stated a lot got worked out in her family session and she is going to follow-up with her outside therapist afterwards. Cooperative and compliant with medications. Will continue to provide feedback and support.   Coping/Psychosocial   Plan of Care Reviewed With --  patient --    Coping/Psychosocial   Patient Agreement with Plan of Care --  agrees --    Plan of Care Review   Progress improving --  --        Problem: Overarching Goals (Adult)  Goal: Adheres to Safety Considerations for Self and Others  Outcome: Ongoing (interventions implemented as appropriate)   02/15/19 0256   Overarching Goals (Adult)   Adheres to Safety Considerations for Self and Others making progress toward outcome     Goal: Optimized Coping Skills in Response to Life Stressors  Outcome: Ongoing (interventions implemented as appropriate)   02/15/19 0256   Overarching Goals (Adult)   Optimized Coping Skills in Response to Life Stressors making progress toward outcome     Goal: Develops/Participates in Therapeutic [place] to Support Successful Transition  Outcome: Ongoing (interventions implemented as appropriate)   02/15/19 0256   Overarching Goals (Adult)   Develops/Participates in Therapeutic [place] to Support Successful Transition making progress toward outcome       Problem: Mood Impairment (Depressive Signs/Symptoms) (Adult)  Goal: Improved Mood Symptoms (Depressive Signs/Symptoms)  Outcome: Ongoing (interventions implemented as appropriate)   02/15/19 0256   Improved Mood Symptoms (Depressive Signs/Symptoms)   Improved Mood Symptoms Action Step (STG) Outcome making progress toward outcome        Problem: Feelings of Worthlessness, Hopelessness, Excessive Guilt (Depressive Signs/Symptoms) (Adult)  Goal: Enhanced Self-Esteem/Confidence (Depressive Signs/Symptoms)  Outcome: Ongoing (interventions implemented as appropriate)   02/15/19 0256   Enhanced Self-Esteem/Confidence (Depressive Signs/Symptoms)   Enhanced Self-Esteem/Confidence Action Step (STG) Outcome making progress toward outcome       Problem: Decreased Participation/Engagement (Depressive Signs/Symptoms) (Adult)  Goal: Increased Participation/Engagement (Depressive Signs/Symptoms)  Outcome: Ongoing (interventions implemented as appropriate)   02/15/19 0256   Increased Participation/Engagement (Depressive Signs/Symptoms)   Increased Participation/Engagement Action Step (STG) Outcome making progress toward outcome       Problem: Sleep Impairment (Depressive Signs/Symptoms) (Adult)  Goal: Improved Sleep Hygiene (Depressive Signs/Symptoms)  Outcome: Ongoing (interventions implemented as appropriate)   02/15/19 0256   Improved Sleep Hygiene (Depressive Signs/Symptoms)   Improved Sleep Hygiene Action Step (STG) Outcome making progress toward outcome       Problem: Social/Occupational/Functional Impairment (Depressive Signs/Symptoms) (Adult)  Goal: Improved Social/Occupational/Functional Skills (Depressive Signs/Symptoms)  Outcome: Ongoing (interventions implemented as appropriate)   02/15/19 0256   Improved Social/Occupational/Functional Skills (Depressive Signs/Symptoms)   Improved Social/Occupational/Functional Skills Action Step (STG) Outcome making progress toward outcome       Problem: Energy/Vigor Impairment (Depressive Signs/Symptoms) (Adult)  Goal: Improved Energy/Vigor (Depressive Signs/Symptoms)  Outcome: Ongoing (interventions implemented as appropriate)   02/15/19 0256   Improved Energy/Vigor (Depressive Signs/Symptoms)   Improved Energy/Vigor Action Step (STG) Outcome making progress toward outcome

## 2019-02-15 NOTE — DISCHARGE SUMMARY
DATES OF ADMISSION: 2/9/2019-2/15/2019    REASON FOR ADMISSION: The patient is a 33-year-old white female admitted with increasing suicidal ideation.    LABS: See chart    HOSPITAL COURSE: The patient was admitted by Dr. Varela who is her outpatient provider.  Dr. aVrela made some adjustments of her medication, increasing trintellix to 20 mg daily and Lamictal to 200 mg nightly.  Other medications including Valium, Vyvanse and Vraylar were unchanged.  The patient's initial participation within the therapeutic milieu was less than optimal but this improved as her stay in the hospital progress.  She reported reduction in suicidal ideation and had a positive family therapy session on 214 2019. On 2/15/ 2019, the patient was in better spirits and requested discharge.  She declined an offer of participation in the intensive outpatient program.    FINAL DIAGNOSIS: Major depressive disorder recurrent moderate    DISPOSITION ON DISCHARGE: A full listing of the patient's medications as provided below.  Follow-up will take place to the offices of [facility].    PROGNOSIS: Good       Discharge Medications      Changes to Medications      Instructions Start Date   lamoTRIgine 200 MG tablet  Commonly known as:  LaMICtal  What changed:    · medication strength  · when to take this   200 mg, Oral, Nightly      Vortioxetine HBr 10 MG tablet  What changed:    · how much to take  · when to take this   20 mg, Oral, Daily         Continue These Medications      Instructions Start Date   Cariprazine HCl 1.5 MG capsule capsule  Commonly known as:  VRAYLAR   1.5 mg, Oral, Daily      diazePAM 10 MG tablet  Commonly known as:  VALIUM   10 mg, Oral, Every 8 Hours PRN      VYVANSE 60 MG capsule  Generic drug:  lisdexamfetamine   60 mg, Oral, Every Morning

## 2019-02-15 NOTE — PROGRESS NOTES
Continued Stay Note  [facility]     Patient Name: Alexandra Merchant  MRN: [id number]  Today's Date: 2/15/2019    Admit Date: 2/9/2019    Discharge Plan     Row Name 02/15/19 1043       Plan    Final Discharge Disposition Code  01 - home or self-care    Final Note  Pt will be discharged per Dr. Sheffield's orders.  SW met w/pt to discuss follow-up care which was already set-up by pt's primary SW.  Pt will follow-up with svcs at [facility], Dr. Varela for medication management & Fabio Steinberg, Therapist.  Pt will be transported home by her spouse.        Discharge Codes    No documentation.             TANVI Hernandez

## 2019-02-19 ENCOUNTER — TELEPHONE (OUTPATIENT)
Dept: PSYCHIATRY | Facility: HOSPITAL | Age: 34
End: 2019-02-19

## 2019-02-19 NOTE — TELEPHONE ENCOUNTER
"Patient reports she is doing \"okay.\"  She reports that she has all her medications except the Trintellix, which requires prior authorization.  She states she will see her therapist tomorrow and can ask Dr. Varela tomorrow if she needs to.  She states she is resting now until she sees Dr. Varela again.  No further assistance requested at this time.  "

## 2020-04-07 ENCOUNTER — OFFICE VISIT CONVERTED (OUTPATIENT)
Dept: FAMILY MEDICINE CLINIC | Age: 35
End: 2020-04-07
Attending: NURSE PRACTITIONER

## 2021-05-18 NOTE — PROGRESS NOTES
"Alexandra Merchant  1985     Office/Outpatient Visit    Visit Date: Tue, Apr 7, 2020 09:15 am    Provider: Maegan Melo N.P. (Assistant: Candi Fernández RN)    Location: [facility]        Electronically signed by Maegan Melo N.P. on  04/07/2020 08:19:53 PM                             Subjective:        CC: Ms. Merchant is a 35 year old White female.  Pt states \"my asthma is flaring up\", wheezing; PT IS NOT TAKING VYVANSE        HPI: telehealth due to covid 19          Patient presents with unspecified asthma, uncomplicated.  She has asthma which was first diagnosed in adulthood.  Current asthma symptoms include cough and wheezing.  She denies chest tightness, lower extremity edema, fever, hoarseness or sputum production.  She does not smoke.  Asthma triggers include pollens and weather changes.  The asthma has never been severe enough to warrant hospitalization.  Ms. Merchant is also under the care of an allergist ( Dr. [facility] and dr thomson (ENT) ).  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  checks temp daily.  denies any fever.  states this is usual asthma symptoms and has no concerns for covid 19.  practicing social distancing.  restarted zyrtec and dymista.  used daughter's albuterol dose with  some improvement but is katina dose albuterol.      ROS:     CONSTITUTIONAL:  Negative for chills, fatigue, fever, and weight change.      E/N/T:  Positive for nasal congestion and sneezing.   Negative for frequent rhinorrhea or sore throat.      CARDIOVASCULAR:  Negative for chest pain, palpitations, tachycardia, orthopnea, and edema.      RESPIRATORY:  Positive for recent cough ( typically dry ) and frequent wheezing.      MUSCULOSKELETAL:  Negative for myalgias.      ALLERGIC/IMMUNOLOGIC:  Positive for seasonal allergies.          Past Medical History / Family History / Social History:         Last Reviewed on 4/07/2020 09:48 AM by Maegan Melo    Past " Medical History:                 PAST MEDICAL HISTORY     Hospitalizations: panic attack (audobon) 2014         CURRENT MEDICAL PROVIDERS:    Allergist: [facility]    Psychiatrist: Dr. Jose Varela ([facility])         PREVENTIVE HEALTH MAINTENANCE             PAP SMEAR: was last done 2015 with normal results [facility] in [place]. [place]         Surgical History:     Other Surgeries    Bilateral Tubal Ligation         Family History:         Positive for Myocardial Infarction ( father ).      Positive for Type 2 Diabetes.      Positive for Anxiety ( mother; sister ).          Social History:     Occupation: [employer]     Marital Status:      Children: 3 children         Tobacco/Alcohol/Supplements:     Last Reviewed on 4/07/2020 09:15 AM by Candi Fernández    Tobacco: She has never smoked.          Substance Abuse History:     Last Reviewed on 1/18/2018 11:19 AM by Catrachita Rushing        Mental Health History:     Last Reviewed on 1/18/2018 11:19 AM by Catrachita Rushing        Communicable Diseases (eg STDs):     Last Reviewed on 1/18/2018 11:19 AM by Catrachita Rushing        Current Problems:     Last Reviewed on 4/07/2020 09:47 AM by Maegan Melo    Unspecified asthma, uncomplicated        Immunizations:     Influenza Virus Vaccine, unspecified formulation 10/1/2017        Allergies:     Last Reviewed on 4/07/2020 09:16 AM by Candi Fernández    No Known Allergies.        Current Medications:     Last Reviewed on 4/07/2020 10:15 AM by Maegan Melo    diazePAM 10 mg oral tablet [TID prn]    Albuterol 90mcg/1actuation Oral Inhaler [1 puff  QID  PRN]    Symbicort 80mcg/4.5mcg Oral Inhaler [Inhale 1 inhalation(s) by mouth bid]    ADDERALL XR  CAP 30MG  [BID]        Assessment:         J45.909   Unspecified asthma, uncomplicated           ORDERS:         Meds Prescribed:       [New Rx] albuterol sulfate 90 mcg/actuation Inhalation HFA Aerosol Inhaler [inhale 1 - 2 puffs (90 -  180 mcg) by inhalation route every 4 hours as needed], #8.5 (eight point five) grams, Refills: 1 (one)       [New Rx] Advair Diskus 250-50 mcg/dose Inhalation Blister, With Inhalation Device [inhale 1 puff by inhalation route 2 times per day in the morning and evening approximately 12 hours apart], #60 (sixty) blisters, Refills: 4 (four)       [New Rx] albuterol sulfate 2.5 mg /3 mL (0.083 %) Inhalation Solution for Nebulization [inhale 3 milliliters (2.5 mg) by nebulization route q 4-6 hrs prn], #60 (sixty) each, Refills: 1 (one)                 Plan:         Unspecified asthma, uncomplicated    Telehealth: Verbal consent obtained for visit to occur via phone call; Total time spent was 17 minutes; [id number]--Izbvrhcss E/M 11-20 minutes     RECOMMENDATIONS given include: start home nebulizer treatments as instructed, rest, avoidance of heavy exertion, identification and avoidance of asthma triggers, avoidance of cigarette smoke, and follow up and cxr if not improving.  consider use of prednisone short course if inhaled medications not helping.  monitor closely..            Prescriptions:       [New Rx] albuterol sulfate 90 mcg/actuation Inhalation HFA Aerosol Inhaler [inhale 1 - 2 puffs (90 - 180 mcg) by inhalation route every 4 hours as needed], #8.5 (eight point five) grams, Refills: 1 (one)       [New Rx] Advair Diskus 250-50 mcg/dose Inhalation Blister, With Inhalation Device [inhale 1 puff by inhalation route 2 times per day in the morning and evening approximately 12 hours apart], #60 (sixty) blisters, Refills: 4 (four)       [New Rx] albuterol sulfate 2.5 mg /3 mL (0.083 %) Inhalation Solution for Nebulization [inhale 3 milliliters (2.5 mg) by nebulization route q 4-6 hrs prn], #60 (sixty) each, Refills: 1 (one)             Patient Recommendations:        For  Unspecified asthma, uncomplicated:    Get plenty of rest. Avoid heavy exertion. Avoid anything that you have been able to identify as a trigger for your  asthma (for example, cigarette smoke, cat or dog hair, chemical fumes, etc.). Avoid cigarette smoke.              Charge Capture:         Primary Diagnosis:     J45.909  Unspecified asthma, uncomplicated           Orders:      38933  Phys/QHP telephone evaluation 11-20 minutes  (In-House)

## 2021-05-18 NOTE — PROGRESS NOTES
Alexandra MerchantJosué 1985     Office/Outpatient Visit    Visit Date: Thu, Jan 18, 2018 10:40 am    Provider: Catrachita Rushing N.P. (Assistant: Chitra Lynne MA)    Location: [facility]        Electronically signed by Catrachita Rushing N.P. on  01/18/2018 11:42:18 AM                             SUBJECTIVE:        CC:     Ms. Merchant is a 32 year old White female.  She presents with cold chills, fatigue, cough, sore throat.          HPI:         Ms. Merchant presents with influenza, with other manifestations.  Patient notes 'influenza' type symptoms for the past 3 days.  The symptoms include body aches, cough, fever, sore throat and chest congestion.  She reports recent exposure to the flu from ill Co-worker with the flu.  Medical history is significant for asthma.  She reports that she is out of her Albuterol and Symbicort inhalers.      ROS:     CONSTITUTIONAL:  Positive for fatigue and fever.      EYES:  Negative for eye drainage and eye pain.      E/N/T:  Positive for ear pain and sore throat.      CARDIOVASCULAR:  Negative for chest pain and palpitations.      RESPIRATORY:  Positive for recent cough and frequent wheezing.      GASTROINTESTINAL:  Positive for nausea.   Negative for abdominal pain or vomiting.          PMH/FMH/SH:     Last Reviewed on 1/18/2018 11:19 AM by Catrachita Rushing    Past Medical History:                 PAST MEDICAL HISTORY     Hospitalizations: panic attack (audobon) 2014         CURRENT MEDICAL PROVIDERS:    Psychiatrist: Dr. Jose Varela ([facility])         PREVENTIVE HEALTH MAINTENANCE             PAP SMEAR: was last done 2015 with normal results [facility] Physician in [place]. [facility]         Surgical History:     Other Surgeries    Bilateral Tubal Ligation         Family History:         Positive for Myocardial Infarction ( father ).      Positive for Type 2 Diabetes.      Positive for Anxiety ( mother; sister ).          Social History:     Occupation: [employer]      Marital Status:      Children: 3 children         Tobacco/Alcohol/Supplements:     Last Reviewed on 1/18/2018 11:19 AM by Catrachita Rushing    Tobacco: She has never smoked.          Substance Abuse History:     Last Reviewed on 1/18/2018 11:19 AM by Catrachita Rushing        Mental Health History:     Last Reviewed on 1/18/2018 11:19 AM by Catrachita Rushing        Communicable Diseases (eg STDs):     Last Reviewed on 1/18/2018 11:19 AM by Catrachita Rushing            Current Problems:     Last Reviewed on 1/18/2018 11:19 AM by Catrachita Rushing    Anxiety, generalized     Influenza, with other manifestations         Immunizations:     Influenza Virus Vaccine, unspecified formulation 10/1/2017         Allergies:     Last Reviewed on 1/18/2018 11:19 AM by Catrachita Rushing      No Known Drug Allergies.         Current Medications:     Last Reviewed on 1/18/2018 11:19 AM by Catrachita Rushing    Diazepam 10mg Tablet     Vyvanse 60mg Capsules Take 1 capsule(s) by mouth qam         OBJECTIVE:        Vitals:         Current: 1/18/2018 10:41:03 AM    Ht:  5 ft, 5.5 in;  Wt: 169 lbs;  BMI: 27.7    T: 100.2 F (oral);  BP: 115/82 mm Hg (left arm, sitting);  P: 92 bpm (left arm (BP Cuff), sitting)        Exams:     PHYSICAL EXAM:     GENERAL: well developed, well nourished;  no apparent distress;     EYES: PERRL, EOMI     E/N/T: EARS: external auditory canal normal;  both TMs are dull;  NOSE: normal turbinates; no sinus tenderness; OROPHARYNX: oral mucosa is normal; posterior pharynx shows no exudate;     NECK: range of motion is normal; trachea is midline;     RESPIRATORY: normal respiratory rate and pattern with no distress; normal breath sounds with no rales, rhonchi, wheezes or rubs;     CARDIOVASCULAR: normal rate; rhythm is regular;     MUSCULOSKELETAL: normal gait;     NEUROLOGIC: mental status: alert and oriented x 3; GROSSLY INTACT     PSYCHIATRIC: appropriate affect and demeanor;         Lab/Test Results:             Rapid Strep Screen:   Negative (01/18/2018),     Performed by::  [facility] (01/18/2018),     Influenza A and B:  Positive (01/18/2018),             ASSESSMENT           487.8   J10.1  Influenza, with other manifestations              DDx:     493.90   J45.909   J45.998  Asthma, type unspecified              DDx:         ORDERS:         Meds Prescribed:       Albuterol 90mcg/1actuation Oral Inhaler 1 puff  QID  PRN  #1 (One) inhaler refill Refills: 1       Symbicort (Budesonide/Formoterol Fumarate) 80/4.5 Oral Inhaler Inhale 1 inhalation(s) by mouth bid  #1 (One) inhaler(s) Refills: 1         Lab Orders:       [id number]  Infectious agent antigen detection by immunoassay; Influenza  (In-House)         77080  Infectious agent antigen detection by immunoassay; Influenza  (In-House)         85429  Group A Streptococcus detection by immunoassay with direct optical observation  (In-House)                   PLAN:          Influenza, with other manifestations        RECOMMENDATIONS given include: Push Fluids, Rest, Follow up if no improvement or worsening symptoms like high fevers, vomiting, weakness, or increasing shortness of air.    .      FOLLOW-UP: Schedule follow-up appointments on a p.r.n. basis. Chronic visit follow up           Orders:       91323-02  Infectious agent antigen detection by immunoassay; Influenza  (In-House)         79961  Infectious agent antigen detection by immunoassay; Influenza  (In-House)         43389  Group A Streptococcus detection by immunoassay with direct optical observation  (In-House)            Asthma, type unspecified Follow up with allergist per their recommendations.           Prescriptions:       Albuterol 90mcg/1actuation Oral Inhaler 1 puff  QID  PRN  #1 (One) inhaler refill Refills: 1       Symbicort (Budesonide/Formoterol Fumarate) 80/4.5 Oral Inhaler Inhale 1 inhalation(s) by mouth bid  #1 (One) inhaler(s) Refills: 1             Patient Recommendations:        Influenza, with other manifestations     Schedule follow-up appointments as needed.              CHARGE CAPTURE           **Please note: ICD descriptions below are intended for billing purposes only and may not represent clinical diagnoses**        Primary Diagnosis:         487.8 Influenza, with other manifestations            J10.1    Influenza due to other identified influenza virus with other respiratory manifestations              Orders:          40214   Office/outpatient visit; established patient, level 3  (In-House)             09301 -59  Infectious agent antigen detection by immunoassay; Influenza  (In-House)             44803   Infectious agent antigen detection by immunoassay; Influenza  (In-House)             62718   Group A Streptococcus detection by immunoassay with direct optical observation  (In-House)           493.90 Asthma, type unspecified            J45.909    Unspecified asthma, uncomplicated           J45.998    Other asthma

## 2021-06-23 ENCOUNTER — OFFICE VISIT (OUTPATIENT)
Dept: FAMILY MEDICINE CLINIC | Age: 36
End: 2021-06-23

## 2021-06-23 VITALS
SYSTOLIC BLOOD PRESSURE: 136 MMHG | BODY MASS INDEX: 21.58 KG/M2 | DIASTOLIC BLOOD PRESSURE: 83 MMHG | HEART RATE: 89 BPM | WEIGHT: 142.4 LBS | TEMPERATURE: 97.7 F | HEIGHT: 68 IN

## 2021-06-23 DIAGNOSIS — Z00.00 WELL ADULT EXAM: Primary | ICD-10-CM

## 2021-06-23 DIAGNOSIS — M54.41 CHRONIC RIGHT-SIDED LOW BACK PAIN WITH RIGHT-SIDED SCIATICA: ICD-10-CM

## 2021-06-23 DIAGNOSIS — J45.20 MILD INTERMITTENT ASTHMA WITHOUT COMPLICATION: ICD-10-CM

## 2021-06-23 DIAGNOSIS — R19.01 RIGHT UPPER QUADRANT ABDOMINAL MASS: ICD-10-CM

## 2021-06-23 DIAGNOSIS — G89.29 CHRONIC RIGHT-SIDED LOW BACK PAIN WITH RIGHT-SIDED SCIATICA: ICD-10-CM

## 2021-06-23 PROBLEM — F41.9 ANXIETY: Status: ACTIVE | Noted: 2021-06-23

## 2021-06-23 PROBLEM — Z91.51 H/O: SUICIDE ATTEMPT: Status: ACTIVE | Noted: 2021-06-23

## 2021-06-23 PROBLEM — F31.9 BIPOLAR DISORDER (HCC): Status: ACTIVE | Noted: 2021-06-23

## 2021-06-23 PROBLEM — J45.909 MODERATE ASTHMA: Status: RESOLVED | Noted: 2019-02-10 | Resolved: 2021-06-23

## 2021-06-23 PROCEDURE — 99395 PREV VISIT EST AGE 18-39: CPT | Performed by: NURSE PRACTITIONER

## 2021-06-23 PROCEDURE — 99213 OFFICE O/P EST LOW 20 MIN: CPT | Performed by: NURSE PRACTITIONER

## 2021-06-23 RX ORDER — ALBUTEROL SULFATE 90 UG/1
2 AEROSOL, METERED RESPIRATORY (INHALATION) EVERY 4 HOURS PRN
Qty: 8.5 G | Refills: 1 | Status: SHIPPED | OUTPATIENT
Start: 2021-06-23 | End: 2022-06-24 | Stop reason: SDUPTHER

## 2021-06-23 RX ORDER — DEXTROAMPHETAMINE SULFATE, DEXTROAMPHETAMINE SACCHARATE, AMPHETAMINE SULFATE AND AMPHETAMINE ASPARTATE 7.5; 7.5; 7.5; 7.5 MG/1; MG/1; MG/1; MG/1
CAPSULE, EXTENDED RELEASE ORAL 2 TIMES DAILY
COMMUNITY
Start: 2021-06-03

## 2021-06-23 NOTE — PROGRESS NOTES
"Chief Complaint  Annual Exam  Alexandra is a 36-year-old female here today for a well checkup.  She also has asthma which is mostly stable but she is run out of Advair inhaler that she previously received from an allergist.  Would like to get Advair and albuterol refilled today.  Also with some chronic right low back pain with right-sided sciatica with no related injury.  Has never had any x-rays of her low back.  Her last Pap test was in 2017 and normal.  Has never had a mammogram.  Has had a tubal ligation and last.  Was May 13.  She does have some slight irregular periods which is unchanged.  History of PCOS.  She regularly follows with a psychiatrist and a therapist.  Subjective          Alexandra Merchant presents to [facility]    Review of Systems   Constitutional: Negative.    Respiratory: Positive for wheezing.         Intermittent-  Increase with hot weather   Cardiovascular: Negative.    Gastrointestinal: Positive for abdominal pain.        Reports only mild protrusion right upper quadrant intermittent for the last 1 to 2 months.  The area is painful and when it is present.  Denies any associated symptoms such as stomach upset nausea vomiting diarrhea or acid reflux.   Genitourinary: Negative.    Musculoskeletal: Positive for back pain. Negative for gait problem and joint swelling.   Neurological: Negative.    Psychiatric/Behavioral:        Stable on current treatment        Objective   Vital Signs:   Vitals:    06/23/21 1330   BP: 136/83   BP Location: Right arm   Patient Position: Sitting   Pulse: 89   Temp: 97.7 °F (36.5 °C)   TempSrc: Oral   Weight: 64.6 kg (142 lb 6.4 oz)   Height: 172.7 cm (68\")      Physical Exam  Vitals reviewed.   Constitutional:       General: She is not in acute distress.     Appearance: Normal appearance. She is well-developed.   HENT:      Right Ear: Tympanic membrane normal.      Left Ear: Tympanic membrane normal.      Mouth/Throat:      Mouth: " Mucous membranes are moist.      Pharynx: Oropharynx is clear. No oropharyngeal exudate.   Eyes:      Conjunctiva/sclera: Conjunctivae normal.      Pupils: Pupils are equal, round, and reactive to light.   Neck:      Thyroid: No thyroid mass, thyromegaly or thyroid tenderness.   Cardiovascular:      Rate and Rhythm: Normal rate and regular rhythm.      Heart sounds: Normal heart sounds.   Pulmonary:      Effort: Pulmonary effort is normal.      Breath sounds: Normal breath sounds.   Abdominal:      General: Abdomen is flat. Bowel sounds are normal.      Palpations: Abdomen is soft. There is mass.      Comments: 0.75 cm , soft, mobile area noted RUQ    Musculoskeletal:      Right lower leg: No edema.      Left lower leg: No edema.   Skin:     General: Skin is warm and dry.   Neurological:      General: No focal deficit present.      Mental Status: She is alert.   Psychiatric:         Attention and Perception: Attention normal.         Mood and Affect: Mood and affect normal.         Behavior: Behavior normal.          Result Review :              Assessment and Plan    Diagnoses and all orders for this visit:    1. Well adult exam (Primary)  Assessment & Plan:  Preventative care discussed and labs pending    Orders:  -     Comprehensive Metabolic Panel  -     CBC & Differential  -     TSH  -     Lipid Panel    2. Mild intermittent asthma without complication  -     albuterol sulfate  (90 Base) MCG/ACT inhaler; Inhale 2 puffs Every 4 (Four) Hours As Needed for Wheezing.  Dispense: 8.5 g; Refill: 1  -     fluticasone-salmeterol (Advair Diskus) 100-50 MCG/DOSE DISKUS; Inhale 1 puff 2 (Two) Times a Day.  Dispense: 60 each; Refill: 3    3. Chronic right-sided low back pain with right-sided sciatica  Assessment & Plan:  Conservative treatment with anti-inflammatories consider muscle relaxer.  Or physical therapy.  X-rays pending.    Orders:  -     XR Spine Lumbar 2 or 3 View; Future    4. Right upper quadrant  abdominal mass  Assessment & Plan:  Further treatment pending ultrasound results    Orders:  -     US Abdomen Limited; Future      Follow Up    No follow-ups on file.  Patient was given instructions and counseling regarding her condition or for health maintenance advice. Please see specific information pulled into the AVS if appropriate.

## 2021-06-23 NOTE — ASSESSMENT & PLAN NOTE
Conservative treatment with anti-inflammatories consider muscle relaxer.  Or physical therapy.  X-rays pending.

## 2021-06-28 ENCOUNTER — TELEPHONE (OUTPATIENT)
Dept: FAMILY MEDICINE CLINIC | Age: 36
End: 2021-06-28

## 2021-07-01 ENCOUNTER — HOSPITAL ENCOUNTER (OUTPATIENT)
Dept: GENERAL RADIOLOGY | Facility: HOSPITAL | Age: 36
Discharge: HOME | End: 2021-07-01

## 2021-07-01 ENCOUNTER — LAB (OUTPATIENT)
Dept: LAB | Facility: HOSPITAL | Age: 36
End: 2021-07-01

## 2021-07-01 VITALS
DIASTOLIC BLOOD PRESSURE: 82 MMHG | BODY MASS INDEX: 27.16 KG/M2 | TEMPERATURE: 100.2 F | WEIGHT: 169 LBS | HEIGHT: 66 IN | SYSTOLIC BLOOD PRESSURE: 115 MMHG | HEART RATE: 92 BPM

## 2021-07-01 DIAGNOSIS — G89.29 CHRONIC RIGHT-SIDED LOW BACK PAIN WITH RIGHT-SIDED SCIATICA: ICD-10-CM

## 2021-07-01 DIAGNOSIS — M54.41 CHRONIC RIGHT-SIDED LOW BACK PAIN WITH RIGHT-SIDED SCIATICA: ICD-10-CM

## 2021-07-01 DIAGNOSIS — Z00.00 WELL ADULT EXAM: Primary | ICD-10-CM

## 2021-07-01 LAB
ALBUMIN SERPL-MCNC: 4.6 G/DL (ref 3.5–5.2)
ALBUMIN/GLOB SERPL: 1.7 G/DL
ALP SERPL-CCNC: 61 U/L (ref 39–117)
ALT SERPL W P-5'-P-CCNC: 29 U/L (ref 1–33)
ANION GAP SERPL CALCULATED.3IONS-SCNC: 9.4 MMOL/L (ref 5–15)
AST SERPL-CCNC: 15 U/L (ref 1–32)
BASOPHILS # BLD AUTO: 0.03 10*3/MM3 (ref 0–0.2)
BASOPHILS NFR BLD AUTO: 0.5 % (ref 0–1.5)
BILIRUB SERPL-MCNC: 0.3 MG/DL (ref 0–1.2)
BUN SERPL-MCNC: 12 MG/DL (ref 6–20)
BUN/CREAT SERPL: 17.1 (ref 7–25)
CALCIUM SPEC-SCNC: 9.1 MG/DL (ref 8.6–10.5)
CHLORIDE SERPL-SCNC: 100 MMOL/L (ref 98–107)
CHOLEST SERPL-MCNC: 193 MG/DL (ref 0–200)
CO2 SERPL-SCNC: 27.6 MMOL/L (ref 22–29)
CREAT SERPL-MCNC: 0.7 MG/DL (ref 0.57–1)
DEPRECATED RDW RBC AUTO: 42.4 FL (ref 37–54)
EOSINOPHIL # BLD AUTO: 0.05 10*3/MM3 (ref 0–0.4)
EOSINOPHIL NFR BLD AUTO: 0.8 % (ref 0.3–6.2)
ERYTHROCYTE [DISTWIDTH] IN BLOOD BY AUTOMATED COUNT: 12.7 % (ref 12.3–15.4)
GFR SERPL CREATININE-BSD FRML MDRD: 94 ML/MIN/1.73
GLOBULIN UR ELPH-MCNC: 2.7 GM/DL
GLUCOSE SERPL-MCNC: 87 MG/DL (ref 65–99)
HCT VFR BLD AUTO: 42.6 % (ref 34–46.6)
HDLC SERPL-MCNC: 100 MG/DL (ref 40–60)
HGB BLD-MCNC: 14.9 G/DL (ref 12–15.9)
HOLD SPECIMEN: NORMAL
IMM GRANULOCYTES # BLD AUTO: 0 10*3/MM3 (ref 0–0.05)
IMM GRANULOCYTES NFR BLD AUTO: 0 % (ref 0–0.5)
LDLC SERPL CALC-MCNC: 83 MG/DL (ref 0–100)
LDLC/HDLC SERPL: 0.83 {RATIO}
LYMPHOCYTES # BLD AUTO: 1.64 10*3/MM3 (ref 0.7–3.1)
LYMPHOCYTES NFR BLD AUTO: 26.2 % (ref 19.6–45.3)
MCH RBC QN AUTO: 31.7 PG (ref 26.6–33)
MCHC RBC AUTO-ENTMCNC: 35 G/DL (ref 31.5–35.7)
MCV RBC AUTO: 90.6 FL (ref 79–97)
MONOCYTES # BLD AUTO: 0.28 10*3/MM3 (ref 0.1–0.9)
MONOCYTES NFR BLD AUTO: 4.5 % (ref 5–12)
NEUTROPHILS NFR BLD AUTO: 4.26 10*3/MM3 (ref 1.7–7)
NEUTROPHILS NFR BLD AUTO: 68 % (ref 42.7–76)
PLATELET # BLD AUTO: 192 10*3/MM3 (ref 140–450)
PMV BLD AUTO: 10.5 FL (ref 6–12)
POTASSIUM SERPL-SCNC: 4.2 MMOL/L (ref 3.5–5.2)
PROT SERPL-MCNC: 7.3 G/DL (ref 6–8.5)
RBC # BLD AUTO: 4.7 10*6/MM3 (ref 3.77–5.28)
SODIUM SERPL-SCNC: 137 MMOL/L (ref 136–145)
TRIGL SERPL-MCNC: 50 MG/DL (ref 0–150)
TSH SERPL DL<=0.05 MIU/L-ACNC: 1.47 UIU/ML (ref 0.27–4.2)
VLDLC SERPL-MCNC: 10 MG/DL (ref 5–40)
WBC # BLD AUTO: 6.26 10*3/MM3 (ref 3.4–10.8)

## 2021-07-01 PROCEDURE — 36415 COLL VENOUS BLD VENIPUNCTURE: CPT

## 2021-07-01 PROCEDURE — 80053 COMPREHEN METABOLIC PANEL: CPT | Performed by: NURSE PRACTITIONER

## 2021-07-01 PROCEDURE — 80061 LIPID PANEL: CPT | Performed by: NURSE PRACTITIONER

## 2021-07-01 PROCEDURE — 72100 X-RAY EXAM L-S SPINE 2/3 VWS: CPT

## 2021-07-01 PROCEDURE — 85025 COMPLETE CBC W/AUTO DIFF WBC: CPT | Performed by: NURSE PRACTITIONER

## 2021-07-01 PROCEDURE — 84443 ASSAY THYROID STIM HORMONE: CPT | Performed by: NURSE PRACTITIONER

## 2021-07-02 DIAGNOSIS — M54.50 LOW BACK PAIN WITHOUT SCIATICA, UNSPECIFIED BACK PAIN LATERALITY, UNSPECIFIED CHRONICITY: Primary | ICD-10-CM

## 2021-07-02 RX ORDER — BACLOFEN 10 MG/1
10 TABLET ORAL 3 TIMES DAILY PRN
Qty: 30 TABLET | Refills: 1 | Status: SHIPPED | OUTPATIENT
Start: 2021-07-02

## 2021-07-02 NOTE — PROGRESS NOTES
Moderate DDD and arthritis.  If not improving with antiinflammatory, muscle relaxer, and application of heat or cold- consider referral to PT.

## 2021-07-09 ENCOUNTER — HOSPITAL ENCOUNTER (OUTPATIENT)
Dept: ULTRASOUND IMAGING | Facility: HOSPITAL | Age: 36
Discharge: HOME | End: 2021-07-09
Admitting: NURSE PRACTITIONER

## 2021-07-09 DIAGNOSIS — R19.01 RIGHT UPPER QUADRANT ABDOMINAL MASS: ICD-10-CM

## 2021-07-09 PROCEDURE — 76705 ECHO EXAM OF ABDOMEN: CPT

## 2021-07-09 NOTE — PROGRESS NOTES
Gallstone is present with no other abnormality.  Gallstones may pass on their own but I recommend seeing a surgeon to discuss this further.  Gallstones are rarely an emergency.  Please let me know which surgeon she might like to see.

## 2021-07-10 ENCOUNTER — PATIENT MESSAGE (OUTPATIENT)
Dept: FAMILY MEDICINE CLINIC | Age: 36
End: 2021-07-10

## 2021-07-10 DIAGNOSIS — K80.21 CALCULUS OF GALLBLADDER WITH BILIARY OBSTRUCTION BUT WITHOUT CHOLECYSTITIS: Primary | ICD-10-CM

## 2021-07-12 DIAGNOSIS — K80.20 CHOLELITHIASIS WITHOUT CHOLECYSTITIS: Primary | ICD-10-CM

## 2021-07-12 NOTE — TELEPHONE ENCOUNTER
From: Alexandra Merchant  To: TIMOTEO Presley  Sent: 7/10/2021 7:06 PM EDT  Subject: Referral Request    Maegan,    I have reviewed the results in regards to my ultrasound on 7/9/21 regarding which surgeon I would like to see. I would like to see Dr. Bobby Merchant with [facility] in [place]. As he was recommended to me by family. His information is..and is accepting new patients. I did want to let you know that I have been having persistent pain, sweating and nausea for 4 days. The pain seems to be getting worse in the upper abdomen and into my upper right shoulder blade. Please let me know when I can be able to see Dr. Merchant. Thank you. Alexandra Merchant MD  [facility] [place] [facility]  [address] [place] [address], [address]  [place] [id number] [phone]

## 2021-07-13 DIAGNOSIS — R11.0 NAUSEA: Primary | ICD-10-CM

## 2021-07-13 RX ORDER — ONDANSETRON 4 MG/1
4 TABLET, FILM COATED ORAL EVERY 8 HOURS PRN
Qty: 20 TABLET | Refills: 0 | Status: SHIPPED | OUTPATIENT
Start: 2021-07-13 | End: 2021-09-29

## 2021-07-13 NOTE — TELEPHONE ENCOUNTER
Referral to dr rinku anotnio is initiated.  Eat smaller amts more often and avoid fatty food.  Can prescribe zofran or promethazine to take prn nausea.

## 2021-09-24 DIAGNOSIS — R11.0 NAUSEA: ICD-10-CM

## 2021-09-29 RX ORDER — ONDANSETRON 4 MG/1
TABLET, FILM COATED ORAL
Qty: 12 TABLET | Refills: 1 | Status: SHIPPED | OUTPATIENT
Start: 2021-09-29 | End: 2022-06-24

## 2022-06-24 ENCOUNTER — OFFICE VISIT (OUTPATIENT)
Dept: FAMILY MEDICINE CLINIC | Age: 37
End: 2022-06-24

## 2022-06-24 ENCOUNTER — LAB (OUTPATIENT)
Dept: LAB | Facility: HOSPITAL | Age: 37
End: 2022-06-24

## 2022-06-24 ENCOUNTER — HOSPITAL ENCOUNTER (OUTPATIENT)
Dept: GENERAL RADIOLOGY | Facility: HOSPITAL | Age: 37
Discharge: HOME | End: 2022-06-24

## 2022-06-24 VITALS
HEIGHT: 68 IN | OXYGEN SATURATION: 100 % | DIASTOLIC BLOOD PRESSURE: 90 MMHG | SYSTOLIC BLOOD PRESSURE: 129 MMHG | WEIGHT: 135 LBS | BODY MASS INDEX: 20.46 KG/M2 | HEART RATE: 85 BPM

## 2022-06-24 DIAGNOSIS — M54.6 CHRONIC BILATERAL THORACIC BACK PAIN: ICD-10-CM

## 2022-06-24 DIAGNOSIS — Z00.00 WELL ADULT EXAM: ICD-10-CM

## 2022-06-24 DIAGNOSIS — G89.29 CHRONIC BILATERAL THORACIC BACK PAIN: ICD-10-CM

## 2022-06-24 DIAGNOSIS — M54.2 NECK PAIN: ICD-10-CM

## 2022-06-24 DIAGNOSIS — J45.20 MILD INTERMITTENT ASTHMA WITHOUT COMPLICATION: ICD-10-CM

## 2022-06-24 DIAGNOSIS — Z00.00 WELL ADULT EXAM: Primary | ICD-10-CM

## 2022-06-24 PROBLEM — K80.20 CHOLELITHIASIS WITHOUT CHOLECYSTITIS: Status: RESOLVED | Noted: 2021-07-12 | Resolved: 2022-06-24

## 2022-06-24 PROBLEM — R11.0 NAUSEA: Status: RESOLVED | Noted: 2021-07-13 | Resolved: 2022-06-24

## 2022-06-24 PROBLEM — R19.01 RIGHT UPPER QUADRANT ABDOMINAL MASS: Status: RESOLVED | Noted: 2021-06-23 | Resolved: 2022-06-24

## 2022-06-24 LAB
ALBUMIN SERPL-MCNC: 5 G/DL (ref 3.5–5.2)
ALBUMIN/GLOB SERPL: 1.8 G/DL
ALP SERPL-CCNC: 57 U/L (ref 39–117)
ALT SERPL W P-5'-P-CCNC: 18 U/L (ref 1–33)
ANION GAP SERPL CALCULATED.3IONS-SCNC: 10 MMOL/L (ref 5–15)
AST SERPL-CCNC: 14 U/L (ref 1–32)
BASOPHILS # BLD AUTO: 0.04 10*3/MM3 (ref 0–0.2)
BASOPHILS NFR BLD AUTO: 0.7 % (ref 0–1.5)
BILIRUB SERPL-MCNC: 0.5 MG/DL (ref 0–1.2)
BUN SERPL-MCNC: 10 MG/DL (ref 6–20)
BUN/CREAT SERPL: 11.8 (ref 7–25)
CALCIUM SPEC-SCNC: 9.5 MG/DL (ref 8.6–10.5)
CHLORIDE SERPL-SCNC: 100 MMOL/L (ref 98–107)
CHOLEST SERPL-MCNC: 187 MG/DL (ref 0–200)
CO2 SERPL-SCNC: 28 MMOL/L (ref 22–29)
CREAT SERPL-MCNC: 0.85 MG/DL (ref 0.57–1)
DEPRECATED RDW RBC AUTO: 43.1 FL (ref 37–54)
EGFRCR SERPLBLD CKD-EPI 2021: 89.5 ML/MIN/1.73
EOSINOPHIL # BLD AUTO: 0.03 10*3/MM3 (ref 0–0.4)
EOSINOPHIL NFR BLD AUTO: 0.5 % (ref 0.3–6.2)
ERYTHROCYTE [DISTWIDTH] IN BLOOD BY AUTOMATED COUNT: 12.9 % (ref 12.3–15.4)
GLOBULIN UR ELPH-MCNC: 2.8 GM/DL
GLUCOSE SERPL-MCNC: 94 MG/DL (ref 65–99)
HCT VFR BLD AUTO: 43.1 % (ref 34–46.6)
HDLC SERPL-MCNC: 114 MG/DL (ref 40–60)
HGB BLD-MCNC: 14.9 G/DL (ref 12–15.9)
IMM GRANULOCYTES # BLD AUTO: 0.01 10*3/MM3 (ref 0–0.05)
IMM GRANULOCYTES NFR BLD AUTO: 0.2 % (ref 0–0.5)
LDLC SERPL CALC-MCNC: 65 MG/DL (ref 0–100)
LDLC/HDLC SERPL: 0.58 {RATIO}
LYMPHOCYTES # BLD AUTO: 1.57 10*3/MM3 (ref 0.7–3.1)
LYMPHOCYTES NFR BLD AUTO: 26.2 % (ref 19.6–45.3)
MCH RBC QN AUTO: 31.4 PG (ref 26.6–33)
MCHC RBC AUTO-ENTMCNC: 34.6 G/DL (ref 31.5–35.7)
MCV RBC AUTO: 90.9 FL (ref 79–97)
MONOCYTES # BLD AUTO: 0.31 10*3/MM3 (ref 0.1–0.9)
MONOCYTES NFR BLD AUTO: 5.2 % (ref 5–12)
NEUTROPHILS NFR BLD AUTO: 4.03 10*3/MM3 (ref 1.7–7)
NEUTROPHILS NFR BLD AUTO: 67.2 % (ref 42.7–76)
PLATELET # BLD AUTO: 239 10*3/MM3 (ref 140–450)
PMV BLD AUTO: 10.5 FL (ref 6–12)
POTASSIUM SERPL-SCNC: 4.6 MMOL/L (ref 3.5–5.2)
PROT SERPL-MCNC: 7.8 G/DL (ref 6–8.5)
RBC # BLD AUTO: 4.74 10*6/MM3 (ref 3.77–5.28)
SODIUM SERPL-SCNC: 138 MMOL/L (ref 136–145)
TRIGL SERPL-MCNC: 36 MG/DL (ref 0–150)
TSH SERPL DL<=0.05 MIU/L-ACNC: 2.76 UIU/ML (ref 0.27–4.2)
VLDLC SERPL-MCNC: 8 MG/DL (ref 5–40)
WBC NRBC COR # BLD: 5.99 10*3/MM3 (ref 3.4–10.8)

## 2022-06-24 PROCEDURE — 72040 X-RAY EXAM NECK SPINE 2-3 VW: CPT

## 2022-06-24 PROCEDURE — 36415 COLL VENOUS BLD VENIPUNCTURE: CPT

## 2022-06-24 PROCEDURE — 99395 PREV VISIT EST AGE 18-39: CPT | Performed by: NURSE PRACTITIONER

## 2022-06-24 PROCEDURE — 72072 X-RAY EXAM THORAC SPINE 3VWS: CPT

## 2022-06-24 PROCEDURE — 80050 GENERAL HEALTH PANEL: CPT

## 2022-06-24 PROCEDURE — 80061 LIPID PANEL: CPT

## 2022-06-24 RX ORDER — ALBUTEROL SULFATE 90 UG/1
2 AEROSOL, METERED RESPIRATORY (INHALATION) EVERY 4 HOURS PRN
Qty: 8.5 G | Refills: 1 | Status: SHIPPED | OUTPATIENT
Start: 2022-06-24

## 2022-06-24 RX ORDER — LITHIUM CARBONATE 150 MG/1
150 CAPSULE ORAL 2 TIMES DAILY WITH MEALS
COMMUNITY

## 2022-06-24 NOTE — PROGRESS NOTES
"Chief Complaint  Annual Exam    Subjective  Patient is 37-year-old female who is here today for an annual physical.  Sees gynecologist on a regular basis.  Last menstrual cycle is current.  Has been having some occasional irregular menstrual cycles.  Is due for Pap this year.  Does not wear glasses or contacts.  Cannot recall last eye exam.  Is up-to-date on tetanus vaccination    Reports some chronic but intermittent back pain.  1 year ago was more low back but never radiating into the lower extremities.  X-ray lumbar sacral spine 1 year ago noted to generative changes to a moderate degree.  Since that time her pain is occurring more mid and upper back and she feels as if her posturing looks abnormal.  She has completed physical therapy and seeing a chiropractor but that has been more than several years ago.  Currently mid to upper back pain is an almost daily occurrence.  She denies any injury.        Alexandra Merchant presents to [facility]          Objective   Vital Signs:   Vitals:    06/24/22 1320   BP: 129/90   BP Location: Left arm   Patient Position: Sitting   Cuff Size: Adult   Pulse: 85   SpO2: 100%   Weight: 61.2 kg (135 lb)   Height: 172.7 cm (68\")      Body mass index is 20.53 kg/m².  Physical Exam  Vitals reviewed.   Constitutional:       General: She is not in acute distress.     Appearance: Normal appearance. She is well-developed.   HENT:      Right Ear: Tympanic membrane normal.      Left Ear: Tympanic membrane normal.      Mouth/Throat:      Mouth: Mucous membranes are moist.      Pharynx: Oropharynx is clear. No oropharyngeal exudate or posterior oropharyngeal erythema.   Eyes:      Extraocular Movements: Extraocular movements intact.      Pupils: Pupils are equal, round, and reactive to light.   Cardiovascular:      Rate and Rhythm: Normal rate and regular rhythm.      Heart sounds: Normal heart sounds.   Pulmonary:      Effort: Pulmonary effort is normal.      " Breath sounds: Normal breath sounds.   Musculoskeletal:      Right lower leg: No edema.      Left lower leg: No edema.   Skin:     General: Skin is warm and dry.   Neurological:      General: No focal deficit present.      Mental Status: She is alert.   Psychiatric:         Attention and Perception: Attention normal.         Mood and Affect: Mood and affect normal.         Behavior: Behavior normal.          Result Review :     CMP    CMP 7/1/21   Glucose 87   BUN 12   Creatinine 0.70   eGFR Non African Am 94   Sodium 137   Potassium 4.2   Chloride 100   Calcium 9.1   Albumin 4.60   Total Bilirubin 0.3   Alkaline Phosphatase 61   AST (SGOT) 15   ALT (SGPT) 29           CBC    CBC 7/1/21   WBC 6.26   RBC 4.70   Hemoglobin 14.9   Hematocrit 42.6   MCV 90.6   MCH 31.7   MCHC 35.0   RDW 12.7   Platelets 192           CBC w/diff    CBC w/Diff 7/1/21   WBC 6.26   RBC 4.70   Hemoglobin 14.9   Hematocrit 42.6   MCV 90.6   MCH 31.7   MCHC 35.0   RDW 12.7   Platelets 192   Neutrophil Rel % 68.0   Immature Granulocyte Rel % 0.0   Lymphocyte Rel % 26.2   Monocyte Rel % 4.5 (A)   Eosinophil Rel % 0.8   Basophil Rel % 0.5   (A) Abnormal value            Lipid Panel    Lipid Panel 7/1/21   Total Cholesterol 193   Triglycerides 50   HDL Cholesterol 100 (A)   VLDL Cholesterol 10   LDL Cholesterol  83   LDL/HDL Ratio 0.83   (A) Abnormal value            TSH    TSH 7/1/21   TSH 1.470                    Assessment and Plan    Diagnoses and all orders for this visit:    1. Well adult exam (Primary)  Assessment & Plan:  Preventive care measures are discussed.  Further treatment pending lab results.    Orders:  -     CBC w AUTO Differential; Future  -     Comprehensive metabolic panel; Future  -     Lipid panel; Future  -     TSH; Future    2. Mild intermittent asthma without complication  Assessment & Plan:  Asthma is stable.  No refills of Advair needed today.      Orders:  -     albuterol sulfate  (90 Base) MCG/ACT inhaler;  Inhale 2 puffs Every 4 (Four) Hours As Needed for Wheezing.  Dispense: 8.5 g; Refill: 1    3. Chronic bilateral thoracic back pain  -     XR Spine Thoracic 3 View; Future    4. Neck pain  Assessment & Plan:  Further treatment pending x-ray results.  Recommend anti-inflammatories and application of cold or heat as needed.      Orders:  -     XR Spine Cervical 2 or 3 View; Future      Follow Up    No follow-ups on file.  Patient was given instructions and counseling regarding her condition or for health maintenance advice. Please see specific information pulled into the AVS if appropriate.

## 2022-06-24 NOTE — ASSESSMENT & PLAN NOTE
Further treatment pending x-ray results.  Recommend anti-inflammatories and application of cold or heat as needed.

## 2022-06-27 NOTE — PROGRESS NOTES
You are not anemic.  Blood sugar, kidney, liver and thyroid function are normal.  HDL is good cholesterol level.

## 2022-06-29 NOTE — PROGRESS NOTES
Your neck xray shows degenerative change and reversal of normal curvature that one should see in the upper spine.  This is not uncommon.  Physical therapy and / or referral to [facility] spine center could be helpful.

## 2023-05-09 ENCOUNTER — OFFICE VISIT (OUTPATIENT)
Dept: FAMILY MEDICINE CLINIC | Age: 38
End: 2023-05-09
Payer: COMMERCIAL

## 2023-05-09 ENCOUNTER — LAB (OUTPATIENT)
Dept: LAB | Facility: HOSPITAL | Age: 38
End: 2023-05-09
Payer: COMMERCIAL

## 2023-05-09 VITALS
OXYGEN SATURATION: 99 % | HEART RATE: 99 BPM | HEIGHT: 68 IN | BODY MASS INDEX: 21.52 KG/M2 | WEIGHT: 142 LBS | SYSTOLIC BLOOD PRESSURE: 154 MMHG | DIASTOLIC BLOOD PRESSURE: 87 MMHG

## 2023-05-09 DIAGNOSIS — F31.9 BIPOLAR AFFECTIVE DISORDER, REMISSION STATUS UNSPECIFIED: ICD-10-CM

## 2023-05-09 DIAGNOSIS — L65.9 HAIR LOSS: ICD-10-CM

## 2023-05-09 DIAGNOSIS — M79.605 PAIN IN BOTH LOWER EXTREMITIES: ICD-10-CM

## 2023-05-09 DIAGNOSIS — F31.9 BIPOLAR AFFECTIVE DISORDER, REMISSION STATUS UNSPECIFIED: Primary | ICD-10-CM

## 2023-05-09 DIAGNOSIS — R42 DIZZINESS: ICD-10-CM

## 2023-05-09 DIAGNOSIS — M79.604 PAIN IN BOTH LOWER EXTREMITIES: ICD-10-CM

## 2023-05-09 DIAGNOSIS — R53.83 OTHER FATIGUE: ICD-10-CM

## 2023-05-09 DIAGNOSIS — R00.2 PALPITATIONS: ICD-10-CM

## 2023-05-09 PROBLEM — Z00.00 WELL ADULT EXAM: Status: RESOLVED | Noted: 2021-06-23 | Resolved: 2023-05-09

## 2023-05-09 LAB
25(OH)D3 SERPL-MCNC: 29.4 NG/ML (ref 30–100)
ALBUMIN SERPL-MCNC: 5.1 G/DL (ref 3.5–5.2)
ALBUMIN/GLOB SERPL: 1.8 G/DL
ALP SERPL-CCNC: 52 U/L (ref 39–117)
ALT SERPL W P-5'-P-CCNC: 19 U/L (ref 1–33)
ANION GAP SERPL CALCULATED.3IONS-SCNC: 10 MMOL/L (ref 5–15)
AST SERPL-CCNC: 19 U/L (ref 1–32)
BASOPHILS # BLD AUTO: 0.02 10*3/MM3 (ref 0–0.2)
BASOPHILS NFR BLD AUTO: 0.3 % (ref 0–1.5)
BILIRUB SERPL-MCNC: 0.5 MG/DL (ref 0–1.2)
BUN SERPL-MCNC: 11 MG/DL (ref 6–20)
BUN/CREAT SERPL: 15.9 (ref 7–25)
CALCIUM SPEC-SCNC: 9.4 MG/DL (ref 8.6–10.5)
CHLORIDE SERPL-SCNC: 99 MMOL/L (ref 98–107)
CO2 SERPL-SCNC: 29 MMOL/L (ref 22–29)
CREAT SERPL-MCNC: 0.69 MG/DL (ref 0.57–1)
DEPRECATED RDW RBC AUTO: 39.9 FL (ref 37–54)
EGFRCR SERPLBLD CKD-EPI 2021: 112.7 ML/MIN/1.73
EOSINOPHIL # BLD AUTO: 0.02 10*3/MM3 (ref 0–0.4)
EOSINOPHIL NFR BLD AUTO: 0.3 % (ref 0.3–6.2)
ERYTHROCYTE [DISTWIDTH] IN BLOOD BY AUTOMATED COUNT: 12.2 % (ref 12.3–15.4)
FOLATE SERPL-MCNC: >20 NG/ML (ref 4.78–24.2)
GLOBULIN UR ELPH-MCNC: 2.9 GM/DL
GLUCOSE SERPL-MCNC: 94 MG/DL (ref 65–99)
HCT VFR BLD AUTO: 42.6 % (ref 34–46.6)
HGB BLD-MCNC: 15.2 G/DL (ref 12–15.9)
IMM GRANULOCYTES # BLD AUTO: 0.01 10*3/MM3 (ref 0–0.05)
IMM GRANULOCYTES NFR BLD AUTO: 0.2 % (ref 0–0.5)
LITHIUM SERPL-SCNC: <0.1 MMOL/L (ref 0.6–1.2)
LYMPHOCYTES # BLD AUTO: 1.2 10*3/MM3 (ref 0.7–3.1)
LYMPHOCYTES NFR BLD AUTO: 19.1 % (ref 19.6–45.3)
MCH RBC QN AUTO: 31.9 PG (ref 26.6–33)
MCHC RBC AUTO-ENTMCNC: 35.7 G/DL (ref 31.5–35.7)
MCV RBC AUTO: 89.5 FL (ref 79–97)
MONOCYTES # BLD AUTO: 0.23 10*3/MM3 (ref 0.1–0.9)
MONOCYTES NFR BLD AUTO: 3.7 % (ref 5–12)
NEUTROPHILS NFR BLD AUTO: 4.8 10*3/MM3 (ref 1.7–7)
NEUTROPHILS NFR BLD AUTO: 76.4 % (ref 42.7–76)
PLATELET # BLD AUTO: 170 10*3/MM3 (ref 140–450)
PMV BLD AUTO: 10.5 FL (ref 6–12)
POTASSIUM SERPL-SCNC: 3.2 MMOL/L (ref 3.5–5.2)
PROT SERPL-MCNC: 8 G/DL (ref 6–8.5)
RBC # BLD AUTO: 4.76 10*6/MM3 (ref 3.77–5.28)
SODIUM SERPL-SCNC: 138 MMOL/L (ref 136–145)
T-UPTAKE NFR SERPL: 1.13 TBI (ref 0.8–1.3)
T4 SERPL-MCNC: 8.92 MCG/DL (ref 4.5–11.7)
TSH SERPL DL<=0.05 MIU/L-ACNC: 1.88 UIU/ML (ref 0.27–4.2)
VIT B12 BLD-MCNC: 416 PG/ML (ref 211–946)
WBC NRBC COR # BLD: 6.28 10*3/MM3 (ref 3.4–10.8)

## 2023-05-09 PROCEDURE — 80050 GENERAL HEALTH PANEL: CPT

## 2023-05-09 PROCEDURE — 80178 ASSAY OF LITHIUM: CPT

## 2023-05-09 PROCEDURE — 82746 ASSAY OF FOLIC ACID SERUM: CPT

## 2023-05-09 PROCEDURE — 36415 COLL VENOUS BLD VENIPUNCTURE: CPT

## 2023-05-09 PROCEDURE — 84479 ASSAY OF THYROID (T3 OR T4): CPT

## 2023-05-09 PROCEDURE — 82607 VITAMIN B-12: CPT

## 2023-05-09 PROCEDURE — 84436 ASSAY OF TOTAL THYROXINE: CPT

## 2023-05-09 PROCEDURE — 99214 OFFICE O/P EST MOD 30 MIN: CPT | Performed by: NURSE PRACTITIONER

## 2023-05-09 PROCEDURE — 82306 VITAMIN D 25 HYDROXY: CPT

## 2023-05-09 PROCEDURE — 93000 ELECTROCARDIOGRAM COMPLETE: CPT | Performed by: NURSE PRACTITIONER

## 2023-05-09 RX ORDER — AMPHETAMINE 18.8 MG/1
TABLET, ORALLY DISINTEGRATING ORAL
COMMUNITY
Start: 2023-04-21

## 2023-05-09 NOTE — PROGRESS NOTES
"Chief Complaint  Fatigue (Patient states this has been going on for 6 months ), swelling, Alopecia, Weight Gain, Blurred Vision, trouble sleeping, and Elevated Blood Pressure (Has been running elevated has been checking at home )    Subjective          Alexandra Merchant presents to [facility]     Patient is a 38-year-old female who is here today to discuss fatigue over the last 6 months with some intermittent swelling of her hands or feet, hair loss, intermittent dizziness without vertigo and occasional blurred vision without headaches or numbness or tingling.  Also feels as if her blood pressure has been more elevated.  She  does have family history of heart disease but lipid panels for patient had previously been within normal limits.  Her stimulant medication for ADD was recently changed due to usual medicine not being available from the pharmacy.  Patient does not feel as if it is a medication side effect as symptoms were present prior to this change.  She also occasionally feels like she will get some intermittent heart palpitation without chest pain.  She stopped lithium 1 month ago due to wanting to see how she would feel without it  due to concern of side effects.  She reports anxiety and depression are stable.  Her psychiatrist still would like for her to have a lithium level done in addition to having her thyroid checked.  Last menstrual cycle was April 22.  History of tubal ligation.  Denies heavy menstrual cycles.  Cannot recall last eye exam.  Objective   Vital Signs:   Vitals:    05/09/23 1349   BP: 154/87   BP Location: Left arm   Patient Position: Sitting   Cuff Size: Adult   Pulse: 99   SpO2: 99%   Weight: 64.4 kg (142 lb)   Height: 172.7 cm (68\")     Vitals:    05/09/23 1349 05/09/23 1441 05/09/23 1442 05/09/23 1443   Orthostatic BP:  138/88 133/87 134/90   Orthostatic Pulse:  84 98 103   Patient Position: Sitting Lying Sitting Standing        Body mass index is " 21.59 kg/m².  Physical Exam  Vitals reviewed.   Constitutional:       General: She is not in acute distress.     Appearance: Normal appearance. She is well-developed.   HENT:      Right Ear: A middle ear effusion is present.      Left Ear: A middle ear effusion is present.   Neck:      Thyroid: No thyromegaly.   Cardiovascular:      Rate and Rhythm: Normal rate and regular rhythm.      Pulses:           Radial pulses are 2+ on the right side and 2+ on the left side.        Posterior tibial pulses are 2+ on the right side and 2+ on the left side.      Heart sounds: Normal heart sounds.   Pulmonary:      Effort: Pulmonary effort is normal.      Breath sounds: Normal breath sounds.   Musculoskeletal:      Right lower leg: No edema.      Left lower leg: No edema.   Skin:     General: Skin is warm and dry.   Neurological:      General: No focal deficit present.      Mental Status: She is alert.   Psychiatric:         Attention and Perception: Attention normal.         Mood and Affect: Mood and affect normal.         Behavior: Behavior normal.           Current Outpatient Medications:   •  adZENys XR-ODT 18.8 MG Tablet Extended Release Dispersible, DISSOLVE 2 TABLETS BY MOUTH DAILY, Disp: , Rfl:   •  albuterol sulfate  (90 Base) MCG/ACT inhaler, Inhale 2 puffs Every 4 (Four) Hours As Needed for Wheezing., Disp: 8.5 g, Rfl: 1  •  diazePAM (VALIUM) 10 MG tablet, Take 1 tablet by mouth Every 8 (Eight) Hours As Needed for Anxiety. Indications: Feeling Anxious, Disp: , Rfl:   •  fluticasone-salmeterol (Advair Diskus) 100-50 MCG/DOSE DISKUS, Inhale 1 puff 2 (Two) Times a Day., Disp: 60 each, Rfl: 3  •  potassium chloride 10 MEQ CR tablet, Take 1 tablet by mouth 2 (Two) Times a Day., Disp: 30 tablet, Rfl: 0   Past Medical History:   Diagnosis Date   • Bipolar disorder    • Depression    • Panic attack     Hospitalization- 2014   • Unspecified asthma, uncomplicated          Result Review :     CMP        6/24/2022    14:10  5/9/2023    15:18   CMP   Glucose 94   94     BUN 10   11     Creatinine 0.85   0.69     EGFR 89.5   112.7     Sodium 138   138     Potassium 4.6   3.2     Chloride 100   99     Calcium 9.5   9.4     Total Protein 7.8   8.0     Albumin 5.00   5.1     Globulin 2.8   2.9     Total Bilirubin 0.5   0.5     Alkaline Phosphatase 57   52     AST (SGOT) 14   19     ALT (SGPT) 18   19     Albumin/Globulin Ratio 1.8   1.8     BUN/Creatinine Ratio 11.8   15.9     Anion Gap 10.0   10.0       CBC        6/24/2022    14:10 5/9/2023    15:18   CBC   WBC 5.99   6.28     RBC 4.74   4.76     Hemoglobin 14.9   15.2     Hematocrit 43.1   42.6     MCV 90.9   89.5     MCH 31.4   31.9     MCHC 34.6   35.7     RDW 12.9   12.2     Platelets 239   170       CBC w/diff        6/24/2022    14:10   CBC w/Diff   WBC 5.99     RBC 4.74     Hemoglobin 14.9     Hematocrit 43.1     MCV 90.9     MCH 31.4     MCHC 34.6     RDW 12.9     Platelets 239     Neutrophil Rel % 67.2     Immature Granulocyte Rel % 0.2     Lymphocyte Rel % 26.2     Monocyte Rel % 5.2     Eosinophil Rel % 0.5     Basophil Rel % 0.7       Lipid Panel        6/24/2022    14:10   Lipid Panel   Total Cholesterol 187     Triglycerides 36     HDL Cholesterol 114     VLDL Cholesterol 8     LDL Cholesterol  65     LDL/HDL Ratio 0.58       TSH        6/24/2022    14:10 5/9/2023    15:18   TSH   TSH 2.760   1.880               ECG 12 Lead    Date/Time: 5/18/2023 2:49 PM  Performed by: Lor Mcnair MD  Authorized by: Maegan Melo APRN   Comparison: not compared with previous ECG   Rhythm: sinus rhythm  Rate: normal  Conduction: conduction normal  ST Segments: ST segments normal  T Waves: T waves normal  T flattening: V1  QRS axis: normal    Clinical impression: normal ECG              Assessment and Plan    Diagnoses and all orders for this visit:    1. Bipolar affective disorder, remission status unspecified (Primary)  Assessment & Plan:  Has not been taking lithium x 1 month.    Result is anticipated.  Is requested by psychiatrist.    Orders:  -     Lithium level; Future    2. Hair loss  -     Folate; Future  -     Thyroid Panel With TSH; Future    3. Pain in both lower extremities  -     Vitamin D 25 hydroxy; Future    4. Other fatigue  Assessment & Plan:  Further treatment pending lab results.    Orders:  -     CBC w AUTO Differential; Future  -     Comprehensive metabolic panel; Future  -     Vitamin B12; Future  -     Folate; Future  -     Thyroid Panel With TSH; Future    5. Palpitations  Assessment & Plan:  To review ekg with physician.  To ER if worsens.  Consider holter monitor and referral to cardiology.  Defers holter monitor and cardiology referral at this time.    Orders:  -     ECG 12 Lead    6. Dizziness  -     ECG 12 Lead      Follow Up    No follow-ups on file.  Patient was given instructions and counseling regarding her condition or for health maintenance advice. Please see specific information pulled into the AVS if appropriate.

## 2023-05-10 RX ORDER — POTASSIUM CHLORIDE 750 MG/1
10 TABLET, FILM COATED, EXTENDED RELEASE ORAL 2 TIMES DAILY
Qty: 30 TABLET | Refills: 0 | Status: SHIPPED | OUTPATIENT
Start: 2023-05-10

## 2023-05-10 NOTE — PROGRESS NOTES
Please call patient.  She has a low potassium level.  I would like to send in potassium chloride 10 mEq twice daily x2 weeks and repeat potassium level after that time.  Vitamin D lower limits of normal.  No current anemia or folic acid deficiency.  Blood sugar and liver function are normal.  Lithium level is very low but has not taken lithium in 1 month.  Please talk to your prescribing provider regarding lithium treatment.  Thyroid function is normal.

## 2023-05-16 PROBLEM — L65.9 HAIR LOSS: Status: ACTIVE | Noted: 2023-05-16

## 2023-05-16 PROBLEM — R00.2 PALPITATIONS: Status: ACTIVE | Noted: 2023-05-16

## 2023-05-16 PROBLEM — M79.604 PAIN IN BOTH LOWER EXTREMITIES: Status: ACTIVE | Noted: 2023-05-16

## 2023-05-16 PROBLEM — M79.605 PAIN IN BOTH LOWER EXTREMITIES: Status: ACTIVE | Noted: 2023-05-16

## 2023-05-16 PROBLEM — R42 DIZZINESS: Status: ACTIVE | Noted: 2023-05-16

## 2023-05-16 PROBLEM — R53.83 OTHER FATIGUE: Status: ACTIVE | Noted: 2023-05-16

## 2023-05-16 NOTE — ASSESSMENT & PLAN NOTE
To review ekg with physician.  To ER if worsens.  Consider holter monitor and referral to cardiology.  Defers holter monitor and cardiology referral at this time.

## 2024-06-20 ENCOUNTER — TELEPHONE (OUTPATIENT)
Dept: FAMILY MEDICINE CLINIC | Age: 39
End: 2024-06-20
Payer: COMMERCIAL

## 2024-06-20 NOTE — TELEPHONE ENCOUNTER
Caller: Alexandra Merchant    Relationship to patient: Self    Best call back number:     [phone]     Chief complaint: REMOVAL OF STITCHES ABOVE EYE BROW    Type of visit: IN OFFICE PROCEDURE    Requested date: AS SOON AS POSSIBLE THEY WERE DUE TO BE REMOVED YESTERDAY 6.19.2024     If rescheduling, when is the original appointment:      Additional notes:

## 2024-06-21 NOTE — TELEPHONE ENCOUNTER
Called pt to schedule appt. Pt stated her mother-in-law is a nurse and was coming to remove them; no longer needed an appt.

## 2024-07-02 ENCOUNTER — OFFICE VISIT (OUTPATIENT)
Dept: FAMILY MEDICINE CLINIC | Age: 39
End: 2024-07-02
Payer: COMMERCIAL

## 2024-07-02 VITALS
WEIGHT: 155 LBS | OXYGEN SATURATION: 99 % | DIASTOLIC BLOOD PRESSURE: 83 MMHG | SYSTOLIC BLOOD PRESSURE: 122 MMHG | BODY MASS INDEX: 23.49 KG/M2 | HEIGHT: 68 IN | HEART RATE: 82 BPM | TEMPERATURE: 98 F

## 2024-07-02 DIAGNOSIS — Z12.31 BREAST CANCER SCREENING BY MAMMOGRAM: ICD-10-CM

## 2024-07-02 DIAGNOSIS — Z00.00 WELL ADULT EXAM: Primary | ICD-10-CM

## 2024-07-02 DIAGNOSIS — L91.8 SKIN TAG: ICD-10-CM

## 2024-07-02 DIAGNOSIS — R23.2 HOT FLASHES: ICD-10-CM

## 2024-07-02 PROCEDURE — 99396 PREV VISIT EST AGE 40-64: CPT | Performed by: NURSE PRACTITIONER

## 2024-07-02 RX ORDER — ZALEPLON 10 MG/1
CAPSULE ORAL
COMMUNITY
Start: 2024-05-16

## 2024-07-02 RX ORDER — DEXTROAMPHETAMINE SACCHARATE, AMPHETAMINE ASPARTATE MONOHYDRATE, DEXTROAMPHETAMINE SULFATE AND AMPHETAMINE SULFATE 7.5; 7.5; 7.5; 7.5 MG/1; MG/1; MG/1; MG/1
CAPSULE, EXTENDED RELEASE ORAL
COMMUNITY
Start: 2019-06-07

## 2024-07-02 NOTE — ASSESSMENT & PLAN NOTE
Area may blister or scab.  Do not pick on area and watch for signs of infection.  A second treatment may be indicated after 2 weeks

## 2024-07-02 NOTE — PROGRESS NOTES
"Chief Complaint  Annual Exam (Physical - declined to schedule next physical wants to wait )    Subjective          Alexandra Merchant presents to [facility]     Patient is a 39-year-old female who is here today for an annual physical.  History of tubal ligation.  Last menstrual cycle June 23.  Has not yet had a mammogram done and would like to have one ordered.  Last Pap 2 years ago negative.  She takes a vitamin D supplement for vitamin D deficiency.  Patient is concerned about 20 pounds of weight gain in 2 years and fatigue.  Patient does feel as if menstrual cycles have become more erratic and has had 2 different episodes of spotting in the last 1 month.  Mother and sister started menopause in their early 40s.  Is starting to experience some hot flashes.  She does exercise and states she eats healthy and her diet has not changed.  She does not understand why her weight has increased.  Drinks only water.  Does not sleep well.  Dr. Varela, psychiatrist, advised she talk to primary care about her concerns.  Patient and psychiatrist do not feel as if it is due to medication side effects.    Patient has a skin lesion on the back of her left shoulder blade that she would like to have inspected.     Objective   Vital Signs:   Vitals:    07/02/24 1306   BP: 122/83   BP Location: Left arm   Patient Position: Sitting   Cuff Size: Adult   Pulse: 82   Temp: 98 °F (36.7 °C)   TempSrc: Oral   SpO2: 99%   Weight: 70.3 kg (155 lb)   Height: 172.7 cm (68\")       Wt Readings from Last 3 Encounters:   07/02/24 70.3 kg (155 lb)   05/09/23 64.4 kg (142 lb)   06/24/22 61.2 kg (135 lb)      BP Readings from Last 3 Encounters:   07/02/24 122/83   05/09/23 154/87   06/24/22 129/90       Body mass index is 23.57 kg/m².    BMI is within normal parameters. No other follow-up for BMI required.       Physical Exam  Vitals reviewed.   Constitutional:       General: She is not in acute distress.     Appearance: " Normal appearance. She is well-developed.   HENT:      Right Ear: Tympanic membrane normal.      Left Ear: Tympanic membrane normal.      Mouth/Throat:      Pharynx: No oropharyngeal exudate or posterior oropharyngeal erythema.   Neck:      Thyroid: No thyromegaly.   Cardiovascular:      Rate and Rhythm: Normal rate and regular rhythm.      Pulses:           Posterior tibial pulses are 2+ on the right side and 2+ on the left side.      Heart sounds: Normal heart sounds.   Pulmonary:      Effort: Pulmonary effort is normal.      Breath sounds: Normal breath sounds.   Abdominal:      General: Abdomen is flat. Bowel sounds are normal. There is no distension.      Palpations: Abdomen is soft. There is no mass.      Tenderness: There is no abdominal tenderness. There is no guarding or rebound.   Musculoskeletal:        Arms:       Right lower leg: No edema.      Left lower leg: No edema.   Skin:     General: Skin is warm and dry.   Neurological:      General: No focal deficit present.      Mental Status: She is alert.   Psychiatric:         Attention and Perception: Attention normal.         Mood and Affect: Mood and affect normal.         Behavior: Behavior normal.           Current Outpatient Medications:     adZENys XR-ODT 18.8 MG Tablet Extended Release Dispersible, DISSOLVE 2 TABLETS BY MOUTH DAILY, Disp: , Rfl:     albuterol sulfate  (90 Base) MCG/ACT inhaler, Inhale 2 puffs Every 4 (Four) Hours As Needed for Wheezing., Disp: 8.5 g, Rfl: 1    amphetamine-dextroamphetamine XR (ADDERALL XR) 30 MG 24 hr capsule, , Disp: , Rfl:     diazePAM (VALIUM) 10 MG tablet, Take 1 tablet by mouth Every 8 (Eight) Hours As Needed for Anxiety. Indications: Feeling Anxious, Disp: , Rfl:     fluticasone-salmeterol (Advair Diskus) 100-50 MCG/DOSE DISKUS, Inhale 1 puff 2 (Two) Times a Day., Disp: 60 each, Rfl: 3    zaleplon (SONATA) 10 MG capsule, , Disp: , Rfl:    Past Medical History:   Diagnosis Date    ADHD (attention deficit  hyperactivity disorder) 8/20/2018    Psychiatrist    Bipolar disorder     Cholelithiasis     Removed sept.2021    Depression     Headache 05/1/2023    Low back pain 05/20/2023    Seeing a chiropractor to help manage pain    Panic attack     Hospitalization- 2014    Unspecified asthma, uncomplicated      No Known Allergies            Result Review :          No Images in the past 120 days found..       Cryotherapy, Skin Lesion    Date/Time: 7/2/2024 12:30 PM    Performed by: Maegan Melo APRN  Authorized by: Maegan Melo APRN  Local anesthesia used: no    Anesthesia:  Local anesthesia used: no    Sedation:  Patient sedated: no    Comments: Skin tag on back-  5 cycles of 4 seconds each.  Patient tolerated well.         Social History     Tobacco Use   Smoking Status Never   Smokeless Tobacco Never           Assessment and Plan    Diagnoses and all orders for this visit:    1. Well adult exam (Primary)  Assessment & Plan:  Preventive care measures are discussed.  Further treatment recommendations pending lab results.    Orders:  -     Comprehensive metabolic panel; Future  -     CBC w AUTO Differential; Future  -     Hemoglobin A1c; Future  -     Lipid panel; Future  -     TSH Rfx On Abnormal To Free T4; Future    2. Hot flashes  -     Estrogens, Total; Future  -     FSH & LH; Future  -     Progesterone; Future    3. Skin tag  Assessment & Plan:  Area may blister or scab.  Do not pick on area and watch for signs of infection.  A second treatment may be indicated after 2 weeks    Orders:  -     Cryotherapy, Skin Lesion    4. Breast cancer screening by mammogram  -     Mammo Screening Digital Tomosynthesis Bilateral With CAD; Future        Follow Up    No follow-ups on file.  Patient was given instructions and counseling regarding her condition or for health maintenance advice. Please see specific information pulled into the AVS if appropriate.

## 2024-07-08 ENCOUNTER — TRANSCRIBE ORDERS (OUTPATIENT)
Dept: LAB | Facility: HOSPITAL | Age: 39
End: 2024-07-08
Payer: COMMERCIAL

## 2024-07-08 ENCOUNTER — LAB (OUTPATIENT)
Dept: LAB | Facility: HOSPITAL | Age: 39
End: 2024-07-08
Payer: COMMERCIAL

## 2024-07-08 DIAGNOSIS — F41.1 GENERALIZED ANXIETY DISORDER: ICD-10-CM

## 2024-07-08 DIAGNOSIS — R23.2 HOT FLASHES: ICD-10-CM

## 2024-07-08 DIAGNOSIS — Z79.899 ENCOUNTER FOR LONG-TERM (CURRENT) USE OF OTHER MEDICATIONS: Primary | ICD-10-CM

## 2024-07-08 DIAGNOSIS — Z79.899 ENCOUNTER FOR LONG-TERM (CURRENT) USE OF OTHER MEDICATIONS: ICD-10-CM

## 2024-07-08 DIAGNOSIS — Z00.00 WELL ADULT EXAM: ICD-10-CM

## 2024-07-08 LAB
ALBUMIN SERPL-MCNC: 4.6 G/DL (ref 3.5–5.2)
ALBUMIN/GLOB SERPL: 1.5 G/DL
ALP SERPL-CCNC: 74 U/L (ref 39–117)
ALT SERPL W P-5'-P-CCNC: 16 U/L (ref 1–33)
ANION GAP SERPL CALCULATED.3IONS-SCNC: 11.1 MMOL/L (ref 5–15)
AST SERPL-CCNC: 11 U/L (ref 1–32)
BASOPHILS # BLD AUTO: 0.01 10*3/MM3 (ref 0–0.2)
BASOPHILS NFR BLD AUTO: 0.2 % (ref 0–1.5)
BILIRUB SERPL-MCNC: 0.4 MG/DL (ref 0–1.2)
BUN SERPL-MCNC: 15 MG/DL (ref 6–20)
BUN/CREAT SERPL: 20 (ref 7–25)
CALCIUM SPEC-SCNC: 9.3 MG/DL (ref 8.6–10.5)
CHLORIDE SERPL-SCNC: 99 MMOL/L (ref 98–107)
CHOLEST SERPL-MCNC: 197 MG/DL (ref 0–200)
CO2 SERPL-SCNC: 26.9 MMOL/L (ref 22–29)
CREAT SERPL-MCNC: 0.75 MG/DL (ref 0.57–1)
DEPRECATED RDW RBC AUTO: 41.8 FL (ref 37–54)
EGFRCR SERPLBLD CKD-EPI 2021: 102.7 ML/MIN/1.73
EOSINOPHIL # BLD AUTO: 0.02 10*3/MM3 (ref 0–0.4)
EOSINOPHIL NFR BLD AUTO: 0.4 % (ref 0.3–6.2)
ERYTHROCYTE [DISTWIDTH] IN BLOOD BY AUTOMATED COUNT: 12.2 % (ref 12.3–15.4)
FSH SERPL-ACNC: 24.9 MIU/ML
GLOBULIN UR ELPH-MCNC: 3.1 GM/DL
GLUCOSE SERPL-MCNC: 93 MG/DL (ref 65–99)
HBA1C MFR BLD: 5.4 % (ref 4.8–5.6)
HCT VFR BLD AUTO: 43.7 % (ref 34–46.6)
HDLC SERPL-MCNC: 101 MG/DL (ref 40–60)
HGB BLD-MCNC: 14.8 G/DL (ref 12–15.9)
IMM GRANULOCYTES # BLD AUTO: 0 10*3/MM3 (ref 0–0.05)
IMM GRANULOCYTES NFR BLD AUTO: 0 % (ref 0–0.5)
LDLC SERPL CALC-MCNC: 84 MG/DL (ref 0–100)
LDLC/HDLC SERPL: 0.82 {RATIO}
LH SERPL-ACNC: 23.8 MIU/ML
LITHIUM SERPL-SCNC: <0.1 MMOL/L (ref 0.6–1.2)
LYMPHOCYTES # BLD AUTO: 1.7 10*3/MM3 (ref 0.7–3.1)
LYMPHOCYTES NFR BLD AUTO: 38.1 % (ref 19.6–45.3)
MCH RBC QN AUTO: 31 PG (ref 26.6–33)
MCHC RBC AUTO-ENTMCNC: 33.9 G/DL (ref 31.5–35.7)
MCV RBC AUTO: 91.6 FL (ref 79–97)
MONOCYTES # BLD AUTO: 0.23 10*3/MM3 (ref 0.1–0.9)
MONOCYTES NFR BLD AUTO: 5.2 % (ref 5–12)
NEUTROPHILS NFR BLD AUTO: 2.5 10*3/MM3 (ref 1.7–7)
NEUTROPHILS NFR BLD AUTO: 56.1 % (ref 42.7–76)
PLATELET # BLD AUTO: 230 10*3/MM3 (ref 140–450)
PMV BLD AUTO: 10.5 FL (ref 6–12)
POTASSIUM SERPL-SCNC: 4.5 MMOL/L (ref 3.5–5.2)
PROGEST SERPL-MCNC: 0.27 NG/ML
PROT SERPL-MCNC: 7.7 G/DL (ref 6–8.5)
RBC # BLD AUTO: 4.77 10*6/MM3 (ref 3.77–5.28)
SODIUM SERPL-SCNC: 137 MMOL/L (ref 136–145)
T3 SERPL-MCNC: 106 NG/DL (ref 80–200)
T4 SERPL-MCNC: 7.95 MCG/DL (ref 4.5–11.7)
TRIGL SERPL-MCNC: 64 MG/DL (ref 0–150)
TSH SERPL DL<=0.05 MIU/L-ACNC: 2.54 UIU/ML (ref 0.27–4.2)
VLDLC SERPL-MCNC: 12 MG/DL (ref 5–40)
WBC NRBC COR # BLD AUTO: 4.46 10*3/MM3 (ref 3.4–10.8)

## 2024-07-08 PROCEDURE — 80178 ASSAY OF LITHIUM: CPT

## 2024-07-08 PROCEDURE — 80050 GENERAL HEALTH PANEL: CPT

## 2024-07-08 PROCEDURE — 84144 ASSAY OF PROGESTERONE: CPT

## 2024-07-08 PROCEDURE — 84480 ASSAY TRIIODOTHYRONINE (T3): CPT

## 2024-07-08 PROCEDURE — 83036 HEMOGLOBIN GLYCOSYLATED A1C: CPT

## 2024-07-08 PROCEDURE — 83001 ASSAY OF GONADOTROPIN (FSH): CPT

## 2024-07-08 PROCEDURE — 80061 LIPID PANEL: CPT

## 2024-07-08 PROCEDURE — 82672 ASSAY OF ESTROGEN: CPT

## 2024-07-08 PROCEDURE — 84436 ASSAY OF TOTAL THYROXINE: CPT

## 2024-07-08 PROCEDURE — 83002 ASSAY OF GONADOTROPIN (LH): CPT

## 2024-07-08 PROCEDURE — 36415 COLL VENOUS BLD VENIPUNCTURE: CPT

## 2024-07-12 LAB — ESTROGEN SERPL-MCNC: 216 PG/ML

## 2024-08-01 ENCOUNTER — HOSPITAL ENCOUNTER (OUTPATIENT)
Dept: MAMMOGRAPHY | Facility: HOSPITAL | Age: 39
Discharge: HOME | End: 2024-08-01
Admitting: NURSE PRACTITIONER
Payer: COMMERCIAL

## 2024-08-01 DIAGNOSIS — Z12.31 BREAST CANCER SCREENING BY MAMMOGRAM: ICD-10-CM

## 2024-08-01 PROCEDURE — 77063 BREAST TOMOSYNTHESIS BI: CPT

## 2024-08-01 PROCEDURE — 77067 SCR MAMMO BI INCL CAD: CPT

## 2024-08-08 DIAGNOSIS — N64.89 BREAST ASYMMETRY: ICD-10-CM

## 2024-08-08 DIAGNOSIS — N63.41 SUBAREOLAR MASS OF RIGHT BREAST: Primary | ICD-10-CM

## 2024-08-22 ENCOUNTER — HOSPITAL ENCOUNTER (OUTPATIENT)
Dept: ULTRASOUND IMAGING | Facility: HOSPITAL | Age: 39
Discharge: HOME | End: 2024-08-22
Payer: COMMERCIAL

## 2024-08-22 ENCOUNTER — HOSPITAL ENCOUNTER (OUTPATIENT)
Dept: MAMMOGRAPHY | Facility: HOSPITAL | Age: 39
Discharge: HOME | End: 2024-08-22
Payer: COMMERCIAL

## 2024-08-22 DIAGNOSIS — N63.41 SUBAREOLAR MASS OF RIGHT BREAST: ICD-10-CM

## 2024-08-22 DIAGNOSIS — N64.89 BREAST ASYMMETRY: ICD-10-CM

## 2024-08-22 PROCEDURE — G0279 TOMOSYNTHESIS, MAMMO: HCPCS

## 2024-08-22 PROCEDURE — 76642 ULTRASOUND BREAST LIMITED: CPT

## 2024-08-22 PROCEDURE — 77065 DX MAMMO INCL CAD UNI: CPT

## 2025-02-18 ENCOUNTER — OFFICE VISIT (OUTPATIENT)
Dept: FAMILY MEDICINE CLINIC | Age: 40
End: 2025-02-18
Payer: COMMERCIAL

## 2025-02-18 VITALS
HEIGHT: 68 IN | DIASTOLIC BLOOD PRESSURE: 82 MMHG | BODY MASS INDEX: 20.95 KG/M2 | WEIGHT: 138.2 LBS | HEART RATE: 95 BPM | TEMPERATURE: 98.2 F | OXYGEN SATURATION: 100 % | SYSTOLIC BLOOD PRESSURE: 123 MMHG

## 2025-02-18 DIAGNOSIS — H61.20 IMPACTED CERUMEN, UNSPECIFIED LATERALITY: ICD-10-CM

## 2025-02-18 DIAGNOSIS — H91.90 HARD OF HEARING: Primary | ICD-10-CM

## 2025-02-18 PROCEDURE — 99213 OFFICE O/P EST LOW 20 MIN: CPT | Performed by: STUDENT IN AN ORGANIZED HEALTH CARE EDUCATION/TRAINING PROGRAM

## 2025-02-18 PROCEDURE — 69209 REMOVE IMPACTED EAR WAX UNI: CPT | Performed by: STUDENT IN AN ORGANIZED HEALTH CARE EDUCATION/TRAINING PROGRAM

## 2025-02-18 NOTE — PROGRESS NOTES
Chief Complaint     Earache (bilateral)    History of Present Illness     Alexandra Merchant is a 39 y.o. female who presents to [facility] with complaints of muffled hearing.       History of Present Illness  The patient is a 39-year-old female presenting to the clinic with complaints of muffled hearing.    She has been experiencing persistent muffled hearing in both ears for several months, likening it to the sensation of being in an airplane. The etiology of this symptom remains unknown to her. Despite attempts to alleviate the condition with sinus medication, there has been no improvement. The constant muffled hearing has become mentally exhausting for her. She reports no associated ear pain but describes a sensation of fullness in her ears. She has tried using eardrops without success. She also reports no sinus issues, nausea, vomiting, diarrhea, sinus pressure, nasal congestion, shortness of breath, or chest pain. She does experience headaches, which she attributes to weather changes.         History      Past Medical History:   Diagnosis Date    ADHD (attention deficit hyperactivity disorder) 8/20/2018    Psychiatrist    Bipolar disorder     Cholelithiasis     Removed sept.2021    Depression     Headache 05/1/2023    Low back pain 05/20/2023    Seeing a chiropractor to help manage pain    Panic attack     Hospitalization- 2014    Unspecified asthma, uncomplicated        Past Surgical History:   Procedure Laterality Date    CHOLECYSTECTOMY  9/19/2021    TUBAL ABDOMINAL LIGATION         Family History   Problem Relation Age of Onset    Anxiety disorder Mother         Unknown    Heart attack Father     Anxiety disorder Father     Cancer Father     Diabetes Father     Hyperlipidemia Father     Kidney disease Father     Anxiety disorder Sister     Thyroid disease Sister     Diabetes type II Other         Current Medications        Current Outpatient Medications:     albuterol  "sulfate  (90 Base) MCG/ACT inhaler, Inhale 2 puffs Every 4 (Four) Hours As Needed for Wheezing., Disp: 8.5 g, Rfl: 1    amphetamine-dextroamphetamine XR (ADDERALL XR) 30 MG 24 hr capsule, Take 1 capsule by mouth Every Morning, Disp: , Rfl:     diazePAM (VALIUM) 10 MG tablet, Take 1 tablet by mouth Every 8 (Eight) Hours As Needed for Anxiety. Indications: Feeling Anxious, Disp: , Rfl:     fluticasone-salmeterol (Advair Diskus) 100-50 MCG/DOSE DISKUS, Inhale 1 puff 2 (Two) Times a Day., Disp: 60 each, Rfl: 3    zaleplon (SONATA) 10 MG capsule, , Disp: , Rfl:     adZENys XR-ODT 18.8 MG Tablet Extended Release Dispersible, DISSOLVE 2 TABLETS BY MOUTH DAILY, Disp: , Rfl:      Allergies     No Known Allergies    Social History       Social History     Social History Narrative    Not on file       Immunizations     Immunization:  Immunization History   Administered Date(s) Administered    Hepatitis A 05/04/2018    Influenza, Unspecified 10/01/2017, 10/01/2019, 10/30/2019    Tdap 05/19/2016, 06/14/2024          Objective     Objective     Vital Signs:   /82 (BP Location: Left arm, Patient Position: Sitting)   Pulse 95   Temp 98.2 °F (36.8 °C) (Oral)   Ht 172.7 cm (68\")   Wt 62.7 kg (138 lb 3.2 oz)   SpO2 100% Comment: on room air  BMI 21.01 kg/m²       Physical Exam  Vitals and nursing note reviewed.   Constitutional:       Appearance: Normal appearance. She is normal weight.   HENT:      Head: Normocephalic.      Right Ear: Ear canal and external ear normal. There is impacted cerumen.      Left Ear: Ear canal and external ear normal.      Ears:      Comments: Unable to visualize the right tympanic membrane due to cerumen impaction, able to visualize part of the left tympanic membrane there is some cerumen blocking the full view.    After cerumen removed from the bilateral ears both tympanic membranes are within normal limits.     Nose: Nose normal.   Eyes:      Conjunctiva/sclera: Conjunctivae normal.    "   Pupils: Pupils are equal, round, and reactive to light.   Cardiovascular:      Rate and Rhythm: Normal rate and regular rhythm.      Pulses: Normal pulses.      Heart sounds: Normal heart sounds.   Pulmonary:      Effort: Pulmonary effort is normal.      Breath sounds: Normal breath sounds.   Abdominal:      General: Bowel sounds are normal.      Palpations: Abdomen is soft.   Musculoskeletal:         General: Normal range of motion.      Cervical back: Normal range of motion and neck supple.   Skin:     General: Skin is warm and dry.   Neurological:      General: No focal deficit present.      Mental Status: She is alert and oriented to person, place, and time.   Psychiatric:         Attention and Perception: Attention normal.         Mood and Affect: Mood and affect normal.         Behavior: Behavior normal. Behavior is cooperative.         Physical Exam  Ears were examined. Cerumen impaction noted.      Results    The following data was reviewed by: TIMOTEO Goncalves on 02/18/25               Results        Ear Cerumen Removal    Date/Time: 2/18/2025 2:54 PM    Performed by: Chitra Berkowitz  Authorized by: Jovana Duckworth APRN    Anesthesia:  Local Anesthetic: none  Ceruminolytics applied: Ceruminolytics applied prior to the procedure.  Location details: left ear and right ear  Patient tolerance: patient tolerated the procedure well with no immediate complications  Comments: Cerumen removed from bilateral ears.  Procedure type: irrigation   Sedation:  Patient sedated: no          Assessment and Plan        Assessment and Plan       Hard of hearing  Patient having difficulties hearing upon exam there is cerumen in the bilateral ears that could be contributing to this.  Once irrigation was performed and cerumen has been removed patient reports hearing has been improved.       Impacted cerumen, unspecified laterality    Orders:    Ear Cerumen Removal  Bilateral cerumen removal, procedure completed and tolerated  without any issues.  Patient's hearing is much improved after procedures been complete.       Assessment & Plan  1. Cerumen impaction.  Symptoms of muffled hearing and self-reported ear fullness are consistent with cerumen impaction. Examination revealed significant earwax buildup, particularly in right ear. Ear drops will be administered to soften the wax, followed by ear irrigation to remove the impacted cerumen. If symptoms persist, further evaluation may be necessary.        Follow Up        Follow Up   Return in about 5 months (around 7/18/2025) for With PCP, Annual physical, sooner if condition worsens.  Patient was given instructions and counseling regarding her condition or for health maintenance advice. Please see specific information pulled into the AVS if appropriate.      Patient or patient representative verbalized consent for the use of Ambient Listening during the visit with  TIMOTEO Goncalves for chart documentation. 2/18/2025  14:33 EST

## 2025-06-13 ENCOUNTER — OFFICE VISIT (OUTPATIENT)
Dept: FAMILY MEDICINE CLINIC | Age: 40
End: 2025-06-13
Payer: COMMERCIAL

## 2025-06-13 VITALS
TEMPERATURE: 97.9 F | HEIGHT: 68 IN | BODY MASS INDEX: 20.92 KG/M2 | WEIGHT: 138 LBS | HEART RATE: 95 BPM | DIASTOLIC BLOOD PRESSURE: 74 MMHG | SYSTOLIC BLOOD PRESSURE: 128 MMHG | OXYGEN SATURATION: 100 %

## 2025-06-13 DIAGNOSIS — Z01.419 WELL WOMAN EXAM: Primary | ICD-10-CM

## 2025-06-13 DIAGNOSIS — Z12.31 BREAST CANCER SCREENING BY MAMMOGRAM: ICD-10-CM

## 2025-06-13 LAB
BILIRUB BLD-MCNC: NEGATIVE MG/DL
CLARITY, POC: CLEAR
COLOR UR: YELLOW
EXPIRATION DATE: ABNORMAL
GLUCOSE UR STRIP-MCNC: NEGATIVE MG/DL
KETONES UR QL: NEGATIVE
LEUKOCYTE EST, POC: ABNORMAL
Lab: ABNORMAL
NITRITE UR-MCNC: NEGATIVE MG/ML
PH UR: 7 [PH] (ref 5–8)
PROT UR STRIP-MCNC: NEGATIVE MG/DL
RBC # UR STRIP: ABNORMAL /UL
SP GR UR: 1.02 (ref 1–1.03)
UROBILINOGEN UR QL: NORMAL

## 2025-06-13 PROCEDURE — G0123 SCREEN CERV/VAG THIN LAYER: HCPCS | Performed by: NURSE PRACTITIONER

## 2025-06-13 PROCEDURE — 87625 HPV TYPES 16 & 18 ONLY: CPT | Performed by: NURSE PRACTITIONER

## 2025-06-13 NOTE — PROGRESS NOTES
"Chief Complaint  Gynecologic Exam    Subjective          Alexandra Merchant presents to [facility]     Patient is a 40-year-old female who is here today for a well woman exam.  History of tubal ligation.  Last menstrual cycle the Wednesday before Memorial Day.  Last Pap -3 years ago.  She had her mammogram done last August 22 which was diagnostic and right breast ultrasound which was resulted as benign.  Annual mammograms were recommended moving forward.  Trace leukocytes in her urine sample today but patient denies dysuria, vaginal discharge, or increase in urination.  Reassurance provided.  She is to report any urinary symptoms if they were to occur.  Had health screening labs done 1 year ago with no concerns.  Defers health screening labs this year.     Objective   Vital Signs:   Vitals:    06/13/25 0953 06/13/25 1039   BP: 148/87 128/74   BP Location: Left arm Right arm   Patient Position: Sitting Sitting   Cuff Size: Adult Adult   Pulse: 95    Temp: 97.9 °F (36.6 °C)    TempSrc: Temporal    SpO2: 100%    Weight: 62.6 kg (138 lb)    Height: 172.7 cm (68\")        Wt Readings from Last 3 Encounters:   06/13/25 62.6 kg (138 lb)   02/18/25 62.7 kg (138 lb 3.2 oz)   07/02/24 70.3 kg (155 lb)      BP Readings from Last 3 Encounters:   06/13/25 128/74   02/18/25 123/82   07/02/24 122/83       Body mass index is 20.98 kg/m².    BMI is within normal parameters. No other follow-up for BMI required.       Physical Exam  Vitals reviewed.   Constitutional:       General: She is not in acute distress.     Appearance: Normal appearance. She is well-developed.   Cardiovascular:      Rate and Rhythm: Normal rate and regular rhythm.      Heart sounds: Normal heart sounds.   Pulmonary:      Effort: Pulmonary effort is normal.      Breath sounds: Normal breath sounds.   Chest:   Breasts:     Right: Normal.      Left: Normal.   Genitourinary:     General: Normal vulva.      Vagina: Normal.      " Cervix: Normal.      Uterus: Normal.       Adnexa: Right adnexa normal and left adnexa normal.   Musculoskeletal:      Right lower leg: No edema.      Left lower leg: No edema.   Lymphadenopathy:      Upper Body:      Right upper body: No axillary adenopathy.      Left upper body: No axillary adenopathy.   Skin:     General: Skin is warm and dry.   Neurological:      General: No focal deficit present.      Mental Status: She is alert.   Psychiatric:         Attention and Perception: Attention normal.         Mood and Affect: Mood and affect normal.         Behavior: Behavior normal.           Current Outpatient Medications:     albuterol sulfate  (90 Base) MCG/ACT inhaler, Inhale 2 puffs Every 4 (Four) Hours As Needed for Wheezing., Disp: 8.5 g, Rfl: 1    amphetamine-dextroamphetamine XR (ADDERALL XR) 30 MG 24 hr capsule, Take 1 capsule by mouth Every Morning, Disp: , Rfl:     diazePAM (VALIUM) 10 MG tablet, Take 1 tablet by mouth Every 8 (Eight) Hours As Needed for Anxiety. Indications: Feeling Anxious, Disp: , Rfl:     zaleplon (SONATA) 10 MG capsule, , Disp: , Rfl:    Past Medical History:   Diagnosis Date    ADHD (attention deficit hyperactivity disorder) 8/20/2018    Psychiatrist    Bipolar disorder     Cholelithiasis     Removed sept.2021    Depression     Headache 05/1/2023    Low back pain 05/20/2023    Seeing a chiropractor to help manage pain    Panic attack     Hospitalization- 2014    Unspecified asthma, uncomplicated      No Known Allergies            Result Review :     Common labs          7/8/2024    13:11   Common Labs   Glucose 93    BUN 15    Creatinine 0.75    Sodium 137    Potassium 4.5    Chloride 99    Calcium 9.3    Albumin 4.6    Total Bilirubin 0.4    Alkaline Phosphatase 74    AST (SGOT) 11    ALT (SGPT) 16    WBC 4.46    Hemoglobin 14.8    Hematocrit 43.7    Platelets 230    Total Cholesterol 197    Triglycerides 64    HDL Cholesterol 101    LDL Cholesterol  84    Hemoglobin A1C  5.40         No Images in the past 120 days found..           Social History     Tobacco Use   Smoking Status Never   Smokeless Tobacco Never           Assessment and Plan    Diagnoses and all orders for this visit:    1. Well woman exam (Primary)  -     POCT urinalysis dipstick, automated  -     PAP, Liquid Based ([facility] Only); Future  -     PAP, Liquid Based ([facility] Only)    2. Breast cancer screening by mammogram  -     Mammo Screening Digital Tomosynthesis Bilateral With CAD; Future        Follow Up    No follow-ups on file.  Patient was given instructions and counseling regarding her condition or for health maintenance advice. Please see specific information pulled into the AVS if appropriate.

## 2025-06-18 LAB
CYTOLOGIST CVX/VAG CYTO: ABNORMAL
CYTOLOGY CVX/VAG DOC CYTO: ABNORMAL
DX ICD CODE: ABNORMAL
DX ICD CODE: ABNORMAL
OTHER STN SPEC: ABNORMAL
PATHOLOGIST CVX/VAG CYTO: ABNORMAL
RECOM F/U CVX/VAG CYTO: ABNORMAL
SERVICE CMNT-IMP: ABNORMAL
STAT OF ADQ CVX/VAG CYTO-IMP: ABNORMAL

## 2025-06-21 LAB — HPV I/H RISK 4 DNA CVX QL PROBE+SIG AMP: NEGATIVE

## 2025-06-23 LAB — SPECIMEN STATUS: NORMAL
